# Patient Record
Sex: FEMALE | Race: BLACK OR AFRICAN AMERICAN | NOT HISPANIC OR LATINO | URBAN - METROPOLITAN AREA
[De-identification: names, ages, dates, MRNs, and addresses within clinical notes are randomized per-mention and may not be internally consistent; named-entity substitution may affect disease eponyms.]

---

## 2022-09-05 ENCOUNTER — INPATIENT (INPATIENT)
Facility: HOSPITAL | Age: 72
LOS: 3 days | Discharge: HOME | End: 2022-09-09
Attending: INTERNAL MEDICINE | Admitting: INTERNAL MEDICINE

## 2022-09-05 VITALS
SYSTOLIC BLOOD PRESSURE: 158 MMHG | HEART RATE: 113 BPM | OXYGEN SATURATION: 100 % | DIASTOLIC BLOOD PRESSURE: 78 MMHG | TEMPERATURE: 98 F | RESPIRATION RATE: 18 BRPM

## 2022-09-05 LAB
ALBUMIN SERPL ELPH-MCNC: 3.7 G/DL — SIGNIFICANT CHANGE UP (ref 3.3–5)
ALP SERPL-CCNC: 79 U/L — SIGNIFICANT CHANGE UP (ref 40–120)
ALT FLD-CCNC: 17 U/L — SIGNIFICANT CHANGE UP (ref 4–33)
ANION GAP SERPL CALC-SCNC: 18 MMOL/L — HIGH (ref 7–14)
APTT BLD: 28.2 SEC — SIGNIFICANT CHANGE UP (ref 27–36.3)
AST SERPL-CCNC: 31 U/L — SIGNIFICANT CHANGE UP (ref 4–32)
B-OH-BUTYR SERPL-SCNC: 2.3 MMOL/L — HIGH (ref 0–0.4)
BASE EXCESS BLDV CALC-SCNC: 0.7 MMOL/L — SIGNIFICANT CHANGE UP (ref -2–3)
BASOPHILS # BLD AUTO: 0.01 K/UL — SIGNIFICANT CHANGE UP (ref 0–0.2)
BASOPHILS NFR BLD AUTO: 0.1 % — SIGNIFICANT CHANGE UP (ref 0–2)
BILIRUB SERPL-MCNC: 0.5 MG/DL — SIGNIFICANT CHANGE UP (ref 0.2–1.2)
BLOOD GAS VENOUS COMPREHENSIVE RESULT: SIGNIFICANT CHANGE UP
BUN SERPL-MCNC: 12 MG/DL — SIGNIFICANT CHANGE UP (ref 7–23)
CALCIUM SERPL-MCNC: 10.6 MG/DL — HIGH (ref 8.4–10.5)
CHLORIDE BLDV-SCNC: 97 MMOL/L — SIGNIFICANT CHANGE UP (ref 96–108)
CHLORIDE SERPL-SCNC: 94 MMOL/L — LOW (ref 98–107)
CO2 BLDV-SCNC: 29 MMOL/L — HIGH (ref 22–26)
CO2 SERPL-SCNC: 23 MMOL/L — SIGNIFICANT CHANGE UP (ref 22–31)
CREAT SERPL-MCNC: 0.61 MG/DL — SIGNIFICANT CHANGE UP (ref 0.5–1.3)
D DIMER BLD IA.RAPID-MCNC: 947 NG/ML DDU — HIGH
EGFR: 94 ML/MIN/1.73M2 — SIGNIFICANT CHANGE UP
EOSINOPHIL # BLD AUTO: 0.04 K/UL — SIGNIFICANT CHANGE UP (ref 0–0.5)
EOSINOPHIL NFR BLD AUTO: 0.4 % — SIGNIFICANT CHANGE UP (ref 0–6)
GAS PNL BLDV: 132 MMOL/L — LOW (ref 136–145)
GLUCOSE BLDV-MCNC: 445 MG/DL — HIGH (ref 70–99)
GLUCOSE SERPL-MCNC: 433 MG/DL — HIGH (ref 70–99)
HCO3 BLDV-SCNC: 27 MMOL/L — SIGNIFICANT CHANGE UP (ref 22–29)
HCT VFR BLD CALC: 36.2 % — SIGNIFICANT CHANGE UP (ref 34.5–45)
HCT VFR BLDA CALC: 37 % — SIGNIFICANT CHANGE UP (ref 34.5–46.5)
HGB BLD CALC-MCNC: 12.4 G/DL — SIGNIFICANT CHANGE UP (ref 11.5–15.5)
HGB BLD-MCNC: 12.2 G/DL — SIGNIFICANT CHANGE UP (ref 11.5–15.5)
IANC: 7.59 K/UL — HIGH (ref 1.8–7.4)
IMM GRANULOCYTES NFR BLD AUTO: 0.8 % — SIGNIFICANT CHANGE UP (ref 0–1.5)
INR BLD: 1.12 RATIO — SIGNIFICANT CHANGE UP (ref 0.88–1.16)
LACTATE BLDV-MCNC: 1.9 MMOL/L — SIGNIFICANT CHANGE UP (ref 0.5–2)
LYMPHOCYTES # BLD AUTO: 1.5 K/UL — SIGNIFICANT CHANGE UP (ref 1–3.3)
LYMPHOCYTES # BLD AUTO: 14.9 % — SIGNIFICANT CHANGE UP (ref 13–44)
MAGNESIUM SERPL-MCNC: 1.9 MG/DL — SIGNIFICANT CHANGE UP (ref 1.6–2.6)
MCHC RBC-ENTMCNC: 30 PG — SIGNIFICANT CHANGE UP (ref 27–34)
MCHC RBC-ENTMCNC: 33.7 GM/DL — SIGNIFICANT CHANGE UP (ref 32–36)
MCV RBC AUTO: 88.9 FL — SIGNIFICANT CHANGE UP (ref 80–100)
MONOCYTES # BLD AUTO: 0.88 K/UL — SIGNIFICANT CHANGE UP (ref 0–0.9)
MONOCYTES NFR BLD AUTO: 8.7 % — SIGNIFICANT CHANGE UP (ref 2–14)
NEUTROPHILS # BLD AUTO: 7.59 K/UL — HIGH (ref 1.8–7.4)
NEUTROPHILS NFR BLD AUTO: 75.1 % — SIGNIFICANT CHANGE UP (ref 43–77)
NRBC # BLD: 0 /100 WBCS — SIGNIFICANT CHANGE UP (ref 0–0)
NRBC # FLD: 0 K/UL — SIGNIFICANT CHANGE UP (ref 0–0)
PCO2 BLDV: 52 MMHG — HIGH (ref 39–42)
PH BLDV: 7.33 — SIGNIFICANT CHANGE UP (ref 7.32–7.43)
PLATELET # BLD AUTO: 508 K/UL — HIGH (ref 150–400)
PO2 BLDV: 21 MMHG — SIGNIFICANT CHANGE UP
POTASSIUM BLDV-SCNC: 6 MMOL/L — HIGH (ref 3.5–5.1)
POTASSIUM SERPL-MCNC: 4.9 MMOL/L — SIGNIFICANT CHANGE UP (ref 3.5–5.3)
POTASSIUM SERPL-SCNC: 4.9 MMOL/L — SIGNIFICANT CHANGE UP (ref 3.5–5.3)
PROT SERPL-MCNC: 8.2 G/DL — SIGNIFICANT CHANGE UP (ref 6–8.3)
PROTHROM AB SERPL-ACNC: 13 SEC — SIGNIFICANT CHANGE UP (ref 10.5–13.4)
RBC # BLD: 4.07 M/UL — SIGNIFICANT CHANGE UP (ref 3.8–5.2)
RBC # FLD: 12.1 % — SIGNIFICANT CHANGE UP (ref 10.3–14.5)
SAO2 % BLDV: 25.3 % — SIGNIFICANT CHANGE UP
SODIUM SERPL-SCNC: 135 MMOL/L — SIGNIFICANT CHANGE UP (ref 135–145)
TROPONIN T, HIGH SENSITIVITY RESULT: 9 NG/L — SIGNIFICANT CHANGE UP
TSH SERPL-MCNC: 0.98 UIU/ML — SIGNIFICANT CHANGE UP (ref 0.27–4.2)
WBC # BLD: 10.1 K/UL — SIGNIFICANT CHANGE UP (ref 3.8–10.5)
WBC # FLD AUTO: 10.1 K/UL — SIGNIFICANT CHANGE UP (ref 3.8–10.5)

## 2022-09-05 PROCEDURE — 93010 ELECTROCARDIOGRAM REPORT: CPT

## 2022-09-05 PROCEDURE — 99285 EMERGENCY DEPT VISIT HI MDM: CPT | Mod: 25

## 2022-09-05 PROCEDURE — 71046 X-RAY EXAM CHEST 2 VIEWS: CPT | Mod: 26

## 2022-09-05 RX ORDER — SODIUM CHLORIDE 9 MG/ML
1000 INJECTION INTRAMUSCULAR; INTRAVENOUS; SUBCUTANEOUS ONCE
Refills: 0 | Status: COMPLETED | OUTPATIENT
Start: 2022-09-05 | End: 2022-09-05

## 2022-09-05 RX ORDER — ACETAMINOPHEN 500 MG
650 TABLET ORAL ONCE
Refills: 0 | Status: COMPLETED | OUTPATIENT
Start: 2022-09-05 | End: 2022-09-05

## 2022-09-05 RX ORDER — CEFTRIAXONE 500 MG/1
1000 INJECTION, POWDER, FOR SOLUTION INTRAMUSCULAR; INTRAVENOUS ONCE
Refills: 0 | Status: COMPLETED | OUTPATIENT
Start: 2022-09-05 | End: 2022-09-05

## 2022-09-05 RX ORDER — AZITHROMYCIN 500 MG/1
500 TABLET, FILM COATED ORAL ONCE
Refills: 0 | Status: COMPLETED | OUTPATIENT
Start: 2022-09-05 | End: 2022-09-05

## 2022-09-05 RX ADMIN — SODIUM CHLORIDE 1000 MILLILITER(S): 9 INJECTION INTRAMUSCULAR; INTRAVENOUS; SUBCUTANEOUS at 22:40

## 2022-09-05 RX ADMIN — CEFTRIAXONE 100 MILLIGRAM(S): 500 INJECTION, POWDER, FOR SOLUTION INTRAMUSCULAR; INTRAVENOUS at 22:41

## 2022-09-05 RX ADMIN — Medication 650 MILLIGRAM(S): at 22:40

## 2022-09-05 RX ADMIN — AZITHROMYCIN 255 MILLIGRAM(S): 500 TABLET, FILM COATED ORAL at 23:37

## 2022-09-05 NOTE — ED ADULT NURSE NOTE - OBJECTIVE STATEMENT
73 y/o female, a&ox4, received to  7 with c/o cough. Pt reports cough for the past few weeks, also endorses "low" O2 sat recorded at home, unsure of levels. PMH of HTN and type 2 diabetes, non-insulin dependent. Pt speaking in clear and coherent sentences, Respirations are even and unlabored, no signs of respiratory distress. NSR on cardiac monitor, 12 lead ECG completed. Pt coughing several times throughout assessment, non-productive cough with mucous build up. Rectal temp of 100.3 recorded, MD Cabello made aware. Skin intact, no signs of pressure ulcer or skin breakdown. Pt claims she is ambulatory at baseline without assistive device. 20G IV placed to right AC, labs collected and sent off.

## 2022-09-05 NOTE — ED PROVIDER NOTE - CLINICAL SUMMARY MEDICAL DECISION MAKING FREE TEXT BOX
71yo F PMH HTN HLD T2DM (on oral meds) presents today c/o productive cough, headache s/p fall 1 wk ago. Pt tachycardic to 110s at presentation. Neuro exam nonfocal. Gait WNL. Given recent travel from [place] last week. Plan to obtain labs, urine, CT head, CXR, dimer, reassess.

## 2022-09-05 NOTE — ED PROVIDER NOTE - PROGRESS NOTE DETAILS
pt coughing in room, +productive w temp 100.3 pt stable, noted to have an elevated dimer. ct angio chest r/o PE ordered. pending imaging. pending trop to result and tba. sign out Dr Ferrer

## 2022-09-05 NOTE — ED ADULT TRIAGE NOTE - CHIEF COMPLAINT QUOTE
cough    c/o productive cough expectorating brown phlegm tonight.  daughter-in-law is rn.. state pulse ox was 85% but pt was able to speak freely.  .  pt c/o ha with cough.,  denies fever, cp, sob, abd pain, n/v/d/.  speaking freely in full sentences.  pmhx diabetes type 2, htn, hi chol.

## 2022-09-05 NOTE — ED PROVIDER NOTE - PHYSICAL EXAMINATION
G: NAD, cooperative with exam   H: NCAT  E: EOMI   M: Mucous membranes moist   R: CTABL, nWOB  C: RRR  A: Soft, NT/ND, no rebound/guarding   MSK: no calf tenderness, no LE edema  Neuro: CN2-12 grossly intact, A&Ox4, MS +5/5 in UE and LE BL, gross sensation intact in UE and LE BL, gait smooth and coordinated

## 2022-09-05 NOTE — ED PROVIDER NOTE - ATTENDING CONTRIBUTION TO CARE
intact Attending Statement: I have personally seen and examined this patient. I have fully participated in the care of this patient. I have reviewed all pertinent clinical information, including history physical exam, plan and the Resident's note and agree except as noted  73yo F PMH HTN HLD T2DM (on oral meds) from home co cough x one week. +productive cough. no fever. no chest pain no SOB. Tonight son states a family friend checked  her "oxygen level it was low" advised to come to ED. pt denies feeling SOB/ pt recently went to a wedding, had three falls, state "I didn't know the place, I fell" "missed steps" pt able to recall all three falls, states she hit her head once. no LOC. family helped her up, has been ambulatory since. no headache no neck. no cp no abdominal pain. nl po intake. no focal numbness/weakness. Vital signs noted. pulse ox 92 lowest to 100RA  temp 100.3 tachycardiac  good air entry no retractions no work of breathing. answering all questions no difficulty soft nontender abdomen. no  rebound. no guarding. no sign of trauma. no CVAT no pedal edema. no calf tenderness. normal pulses bilateral feet. no lac or abrasion of ext. AO3 ambulatory no assistance  plan labs, ekg, ct head, cxr, telemonitor   SR no SCOOBY

## 2022-09-05 NOTE — ED PROVIDER NOTE - OBJECTIVE STATEMENT
73yo F PMH HTN HLD T2DM (on oral meds) presents today c/o productive cough. Cough onset 1 wk ago. Now expectorating brown phlegm tonight.  Daughter-in-law is a nurse. States her son and herself recently got  in [place]. While there, patient had 3 falls. Patient does not recall the initial two falls, but states the last fall was because she stubbed her toe while speaking to someone. +Head trauma. No LOC. Per daughter in law, today patient noted to have inc WOB and states pulse ox was 85% but pt was able to speak in no acute distress. Denies chest pain or palpitations. No prior history of the same. Pt c/o headache ongoing over the same time frame - no laterality. Denies fever, abd pain, n/v/d.

## 2022-09-05 NOTE — ED PROVIDER NOTE - NS ED ROS FT
Gen: No F/C/NS  Head: No falls/head trauma  Eyes: No changes in vision   Resp: +cough +trouble breathing  Cardiovascular: No chest pain or palpitation  Gastroenteric: No N/V/D  :  No change in urine output, dysuria or hematuria   MS: No joint or muscle pain  Neuro: No headache

## 2022-09-05 NOTE — ED ADULT NURSE NOTE - CHIEF COMPLAINT QUOTE
Addended by: OLU PEREZ on: 5/27/2020 03:40 PM     Modules accepted: Orders    
cough    c/o productive cough expectorating brown phlegm tonight.  daughter-in-law is rn.. state pulse ox was 85% but pt was able to speak freely.  .  pt c/o ha with cough.,  denies fever, cp, sob, abd pain, n/v/d/.  speaking freely in full sentences.  pmhx diabetes type 2, htn, hi chol.

## 2022-09-06 DIAGNOSIS — M25.512 PAIN IN LEFT SHOULDER: ICD-10-CM

## 2022-09-06 DIAGNOSIS — E11.9 TYPE 2 DIABETES MELLITUS WITHOUT COMPLICATIONS: ICD-10-CM

## 2022-09-06 DIAGNOSIS — Z29.9 ENCOUNTER FOR PROPHYLACTIC MEASURES, UNSPECIFIED: ICD-10-CM

## 2022-09-06 DIAGNOSIS — M79.89 OTHER SPECIFIED SOFT TISSUE DISORDERS: ICD-10-CM

## 2022-09-06 DIAGNOSIS — J18.9 PNEUMONIA, UNSPECIFIED ORGANISM: ICD-10-CM
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
## 2022-09-06 DIAGNOSIS — I10 ESSENTIAL (PRIMARY) HYPERTENSION: ICD-10-CM

## 2022-09-06 DIAGNOSIS — H26.9 UNSPECIFIED CATARACT: ICD-10-CM

## 2022-09-06 DIAGNOSIS — K64.4 RESIDUAL HEMORRHOIDAL SKIN TAGS: ICD-10-CM

## 2022-09-06 DIAGNOSIS — Z98.49 CATARACT EXTRACTION STATUS, UNSPECIFIED EYE: Chronic | ICD-10-CM

## 2022-09-06 DIAGNOSIS — E78.5 HYPERLIPIDEMIA, UNSPECIFIED: ICD-10-CM

## 2022-09-06 LAB
A1C WITH ESTIMATED AVERAGE GLUCOSE RESULT: 11.9 % — HIGH (ref 4–5.6)
ANION GAP SERPL CALC-SCNC: 12 MMOL/L — SIGNIFICANT CHANGE UP (ref 7–14)
APPEARANCE UR: CLEAR — SIGNIFICANT CHANGE UP
B-OH-BUTYR SERPL-SCNC: 1.3 MMOL/L — HIGH (ref 0–0.4)
BACTERIA # UR AUTO: NEGATIVE — SIGNIFICANT CHANGE UP
BASE EXCESS BLDV CALC-SCNC: -4.6 MMOL/L — LOW (ref -2–3)
BASE EXCESS BLDV CALC-SCNC: 1.6 MMOL/L — SIGNIFICANT CHANGE UP (ref -2–3)
BASOPHILS # BLD AUTO: 0.01 K/UL — SIGNIFICANT CHANGE UP (ref 0–0.2)
BASOPHILS NFR BLD AUTO: 0.1 % — SIGNIFICANT CHANGE UP (ref 0–2)
BILIRUB UR-MCNC: NEGATIVE — SIGNIFICANT CHANGE UP
BLOOD GAS VENOUS COMPREHENSIVE RESULT: SIGNIFICANT CHANGE UP
BLOOD GAS VENOUS COMPREHENSIVE RESULT: SIGNIFICANT CHANGE UP
BUN SERPL-MCNC: 6 MG/DL — LOW (ref 7–23)
CALCIUM SERPL-MCNC: 8.7 MG/DL — SIGNIFICANT CHANGE UP (ref 8.4–10.5)
CHLORIDE BLDV-SCNC: 101 MMOL/L — SIGNIFICANT CHANGE UP (ref 96–108)
CHLORIDE BLDV-SCNC: 102 MMOL/L — SIGNIFICANT CHANGE UP (ref 96–108)
CHLORIDE SERPL-SCNC: 99 MMOL/L — SIGNIFICANT CHANGE UP (ref 98–107)
CO2 BLDV-SCNC: 23 MMOL/L — SIGNIFICANT CHANGE UP (ref 22–26)
CO2 BLDV-SCNC: 27 MMOL/L — HIGH (ref 22–26)
CO2 SERPL-SCNC: 24 MMOL/L — SIGNIFICANT CHANGE UP (ref 22–31)
COLOR SPEC: YELLOW — SIGNIFICANT CHANGE UP
CREAT SERPL-MCNC: 0.3 MG/DL — LOW (ref 0.5–1.3)
DIFF PNL FLD: ABNORMAL
EGFR: 112 ML/MIN/1.73M2 — SIGNIFICANT CHANGE UP
EOSINOPHIL # BLD AUTO: 0.05 K/UL — SIGNIFICANT CHANGE UP (ref 0–0.5)
EOSINOPHIL NFR BLD AUTO: 0.6 % — SIGNIFICANT CHANGE UP (ref 0–6)
EPI CELLS # UR: SIGNIFICANT CHANGE UP
ESTIMATED AVERAGE GLUCOSE: 295 — SIGNIFICANT CHANGE UP
GAS PNL BLDV: 131 MMOL/L — LOW (ref 136–145)
GAS PNL BLDV: 132 MMOL/L — LOW (ref 136–145)
GAS PNL BLDV: SIGNIFICANT CHANGE UP
GLUCOSE BLDV-MCNC: 255 MG/DL — HIGH (ref 70–99)
GLUCOSE BLDV-MCNC: 421 MG/DL — HIGH (ref 70–99)
GLUCOSE SERPL-MCNC: 244 MG/DL — HIGH (ref 70–99)
GLUCOSE UR QL: ABNORMAL
HCO3 BLDV-SCNC: 22 MMOL/L — SIGNIFICANT CHANGE UP (ref 22–29)
HCO3 BLDV-SCNC: 26 MMOL/L — SIGNIFICANT CHANGE UP (ref 22–29)
HCT VFR BLD CALC: 31.5 % — LOW (ref 34.5–45)
HCT VFR BLDA CALC: 28 % — LOW (ref 34.5–46.5)
HCT VFR BLDA CALC: 33 % — LOW (ref 34.5–46.5)
HGB BLD CALC-MCNC: 11.1 G/DL — LOW (ref 11.5–15.5)
HGB BLD CALC-MCNC: 9.4 G/DL — LOW (ref 11.5–15.5)
HGB BLD-MCNC: 10.6 G/DL — LOW (ref 11.5–15.5)
HYALINE CASTS # UR AUTO: 1 /LPF — SIGNIFICANT CHANGE UP (ref 0–7)
IANC: 6.78 K/UL — SIGNIFICANT CHANGE UP (ref 1.8–7.4)
IMM GRANULOCYTES NFR BLD AUTO: 0.5 % — SIGNIFICANT CHANGE UP (ref 0–1.5)
KETONES UR-MCNC: ABNORMAL
LACTATE BLDV-MCNC: 1.1 MMOL/L — SIGNIFICANT CHANGE UP (ref 0.5–2)
LACTATE BLDV-MCNC: 1.3 MMOL/L — SIGNIFICANT CHANGE UP (ref 0.5–2)
LEUKOCYTE ESTERASE UR-ACNC: NEGATIVE — SIGNIFICANT CHANGE UP
LYMPHOCYTES # BLD AUTO: 1.1 K/UL — SIGNIFICANT CHANGE UP (ref 1–3.3)
LYMPHOCYTES # BLD AUTO: 12.7 % — LOW (ref 13–44)
MAGNESIUM SERPL-MCNC: 1.5 MG/DL — LOW (ref 1.6–2.6)
MCHC RBC-ENTMCNC: 29.8 PG — SIGNIFICANT CHANGE UP (ref 27–34)
MCHC RBC-ENTMCNC: 33.7 GM/DL — SIGNIFICANT CHANGE UP (ref 32–36)
MCV RBC AUTO: 88.5 FL — SIGNIFICANT CHANGE UP (ref 80–100)
MONOCYTES # BLD AUTO: 0.67 K/UL — SIGNIFICANT CHANGE UP (ref 0–0.9)
MONOCYTES NFR BLD AUTO: 7.7 % — SIGNIFICANT CHANGE UP (ref 2–14)
NEUTROPHILS # BLD AUTO: 6.78 K/UL — SIGNIFICANT CHANGE UP (ref 1.8–7.4)
NEUTROPHILS NFR BLD AUTO: 78.4 % — HIGH (ref 43–77)
NITRITE UR-MCNC: NEGATIVE — SIGNIFICANT CHANGE UP
NRBC # BLD: 0 /100 WBCS — SIGNIFICANT CHANGE UP (ref 0–0)
NRBC # FLD: 0 K/UL — SIGNIFICANT CHANGE UP (ref 0–0)
PCO2 BLDV: 38 MMHG — LOW (ref 39–42)
PCO2 BLDV: 44 MMHG — HIGH (ref 39–42)
PH BLDV: 7.3 — LOW (ref 7.32–7.43)
PH BLDV: 7.44 — HIGH (ref 7.32–7.43)
PH UR: 6 — SIGNIFICANT CHANGE UP (ref 5–8)
PHOSPHATE SERPL-MCNC: 1.4 MG/DL — LOW (ref 2.5–4.5)
PLATELET # BLD AUTO: 421 K/UL — HIGH (ref 150–400)
PO2 BLDV: 35 MMHG — SIGNIFICANT CHANGE UP
PO2 BLDV: 47 MMHG — SIGNIFICANT CHANGE UP
POTASSIUM BLDV-SCNC: 3.2 MMOL/L — LOW (ref 3.5–5.1)
POTASSIUM BLDV-SCNC: 3.6 MMOL/L — SIGNIFICANT CHANGE UP (ref 3.5–5.1)
POTASSIUM SERPL-MCNC: 3.1 MMOL/L — LOW (ref 3.5–5.3)
POTASSIUM SERPL-SCNC: 3.1 MMOL/L — LOW (ref 3.5–5.3)
PROT UR-MCNC: ABNORMAL
RBC # BLD: 3.56 M/UL — LOW (ref 3.8–5.2)
RBC # FLD: 11.9 % — SIGNIFICANT CHANGE UP (ref 10.3–14.5)
RBC CASTS # UR COMP ASSIST: SIGNIFICANT CHANGE UP /HPF (ref 0–4)
SAO2 % BLDV: 58.1 % — SIGNIFICANT CHANGE UP
SAO2 % BLDV: 81 % — SIGNIFICANT CHANGE UP
SARS-COV-2 RNA SPEC QL NAA+PROBE: SIGNIFICANT CHANGE UP
SODIUM SERPL-SCNC: 135 MMOL/L — SIGNIFICANT CHANGE UP (ref 135–145)
SP GR SPEC: 1.04 — SIGNIFICANT CHANGE UP (ref 1.01–1.05)
UROBILINOGEN FLD QL: SIGNIFICANT CHANGE UP
WBC # BLD: 8.65 K/UL — SIGNIFICANT CHANGE UP (ref 3.8–10.5)
WBC # FLD AUTO: 8.65 K/UL — SIGNIFICANT CHANGE UP (ref 3.8–10.5)
WBC UR QL: 5 /HPF — SIGNIFICANT CHANGE UP (ref 0–5)

## 2022-09-06 PROCEDURE — 99223 1ST HOSP IP/OBS HIGH 75: CPT

## 2022-09-06 PROCEDURE — 71275 CT ANGIOGRAPHY CHEST: CPT | Mod: 26,MG

## 2022-09-06 PROCEDURE — G1004: CPT

## 2022-09-06 PROCEDURE — 70450 CT HEAD/BRAIN W/O DYE: CPT | Mod: 26,MA

## 2022-09-06 PROCEDURE — 99254 IP/OBS CNSLTJ NEW/EST MOD 60: CPT

## 2022-09-06 RX ORDER — MAGNESIUM SULFATE 500 MG/ML
2 VIAL (ML) INJECTION ONCE
Refills: 0 | Status: COMPLETED | OUTPATIENT
Start: 2022-09-06 | End: 2022-09-06

## 2022-09-06 RX ORDER — CHLORHEXIDINE GLUCONATE 213 G/1000ML
1 SOLUTION TOPICAL DAILY
Refills: 0 | Status: DISCONTINUED | OUTPATIENT
Start: 2022-09-06 | End: 2022-09-09

## 2022-09-06 RX ORDER — SODIUM CHLORIDE 9 MG/ML
1000 INJECTION, SOLUTION INTRAVENOUS
Refills: 0 | Status: DISCONTINUED | OUTPATIENT
Start: 2022-09-06 | End: 2022-09-09

## 2022-09-06 RX ORDER — INSULIN LISPRO 100/ML
VIAL (ML) SUBCUTANEOUS EVERY 6 HOURS
Refills: 0 | Status: DISCONTINUED | OUTPATIENT
Start: 2022-09-06 | End: 2022-09-06

## 2022-09-06 RX ORDER — POLYETHYLENE GLYCOL 3350 17 G/17G
17 POWDER, FOR SOLUTION ORAL DAILY
Refills: 0 | Status: DISCONTINUED | OUTPATIENT
Start: 2022-09-06 | End: 2022-09-09

## 2022-09-06 RX ORDER — SITAGLIPTIN 50 MG/1
1 TABLET, FILM COATED ORAL
Qty: 0 | Refills: 0 | DISCHARGE

## 2022-09-06 RX ORDER — METFORMIN HYDROCHLORIDE 850 MG/1
1 TABLET ORAL
Qty: 0 | Refills: 0 | DISCHARGE

## 2022-09-06 RX ORDER — INSULIN LISPRO 100/ML
4 VIAL (ML) SUBCUTANEOUS
Refills: 0 | Status: DISCONTINUED | OUTPATIENT
Start: 2022-09-06 | End: 2022-09-07

## 2022-09-06 RX ORDER — CEFTRIAXONE 500 MG/1
1000 INJECTION, POWDER, FOR SOLUTION INTRAMUSCULAR; INTRAVENOUS EVERY 24 HOURS
Refills: 0 | Status: DISCONTINUED | OUTPATIENT
Start: 2022-09-06 | End: 2022-09-09

## 2022-09-06 RX ORDER — ATORVASTATIN CALCIUM 80 MG/1
1 TABLET, FILM COATED ORAL
Qty: 0 | Refills: 0 | DISCHARGE

## 2022-09-06 RX ORDER — INSULIN LISPRO 100/ML
VIAL (ML) SUBCUTANEOUS AT BEDTIME
Refills: 0 | Status: DISCONTINUED | OUTPATIENT
Start: 2022-09-06 | End: 2022-09-06

## 2022-09-06 RX ORDER — ENOXAPARIN SODIUM 100 MG/ML
40 INJECTION SUBCUTANEOUS EVERY 24 HOURS
Refills: 0 | Status: DISCONTINUED | OUTPATIENT
Start: 2022-09-07 | End: 2022-09-09

## 2022-09-06 RX ORDER — INSULIN LISPRO 100/ML
VIAL (ML) SUBCUTANEOUS
Refills: 0 | Status: DISCONTINUED | OUTPATIENT
Start: 2022-09-06 | End: 2022-09-09

## 2022-09-06 RX ORDER — INSULIN LISPRO 100/ML
3 VIAL (ML) SUBCUTANEOUS
Refills: 0 | Status: DISCONTINUED | OUTPATIENT
Start: 2022-09-06 | End: 2022-09-06

## 2022-09-06 RX ORDER — ACETAMINOPHEN 500 MG
650 TABLET ORAL EVERY 6 HOURS
Refills: 0 | Status: DISCONTINUED | OUTPATIENT
Start: 2022-09-06 | End: 2022-09-09

## 2022-09-06 RX ORDER — ATORVASTATIN CALCIUM 80 MG/1
40 TABLET, FILM COATED ORAL AT BEDTIME
Refills: 0 | Status: DISCONTINUED | OUTPATIENT
Start: 2022-09-06 | End: 2022-09-09

## 2022-09-06 RX ORDER — POTASSIUM PHOSPHATE, MONOBASIC POTASSIUM PHOSPHATE, DIBASIC 236; 224 MG/ML; MG/ML
15 INJECTION, SOLUTION INTRAVENOUS ONCE
Refills: 0 | Status: COMPLETED | OUTPATIENT
Start: 2022-09-06 | End: 2022-09-06

## 2022-09-06 RX ORDER — INSULIN LISPRO 100/ML
VIAL (ML) SUBCUTANEOUS
Refills: 0 | Status: DISCONTINUED | OUTPATIENT
Start: 2022-09-06 | End: 2022-09-06

## 2022-09-06 RX ORDER — DEXTROSE 50 % IN WATER 50 %
12.5 SYRINGE (ML) INTRAVENOUS ONCE
Refills: 0 | Status: DISCONTINUED | OUTPATIENT
Start: 2022-09-06 | End: 2022-09-09

## 2022-09-06 RX ORDER — INSULIN LISPRO 100/ML
VIAL (ML) SUBCUTANEOUS AT BEDTIME
Refills: 0 | Status: DISCONTINUED | OUTPATIENT
Start: 2022-09-06 | End: 2022-09-09

## 2022-09-06 RX ORDER — SODIUM CHLORIDE 9 MG/ML
1000 INJECTION, SOLUTION INTRAVENOUS ONCE
Refills: 0 | Status: COMPLETED | OUTPATIENT
Start: 2022-09-06 | End: 2022-09-06

## 2022-09-06 RX ORDER — INFLUENZA VIRUS VACCINE 15; 15; 15; 15 UG/.5ML; UG/.5ML; UG/.5ML; UG/.5ML
0.7 SUSPENSION INTRAMUSCULAR ONCE
Refills: 0 | Status: DISCONTINUED | OUTPATIENT
Start: 2022-09-06 | End: 2022-09-09

## 2022-09-06 RX ORDER — POTASSIUM CHLORIDE 20 MEQ
20 PACKET (EA) ORAL ONCE
Refills: 0 | Status: COMPLETED | OUTPATIENT
Start: 2022-09-06 | End: 2022-09-06

## 2022-09-06 RX ORDER — DEXTROSE 50 % IN WATER 50 %
25 SYRINGE (ML) INTRAVENOUS ONCE
Refills: 0 | Status: DISCONTINUED | OUTPATIENT
Start: 2022-09-06 | End: 2022-09-09

## 2022-09-06 RX ORDER — INSULIN GLARGINE 100 [IU]/ML
12 INJECTION, SOLUTION SUBCUTANEOUS AT BEDTIME
Refills: 0 | Status: DISCONTINUED | OUTPATIENT
Start: 2022-09-06 | End: 2022-09-06

## 2022-09-06 RX ORDER — DEXTROSE 50 % IN WATER 50 %
15 SYRINGE (ML) INTRAVENOUS ONCE
Refills: 0 | Status: DISCONTINUED | OUTPATIENT
Start: 2022-09-06 | End: 2022-09-09

## 2022-09-06 RX ORDER — INSULIN GLARGINE 100 [IU]/ML
12 INJECTION, SOLUTION SUBCUTANEOUS EVERY MORNING
Refills: 0 | Status: DISCONTINUED | OUTPATIENT
Start: 2022-09-07 | End: 2022-09-07

## 2022-09-06 RX ORDER — AZITHROMYCIN 500 MG/1
500 TABLET, FILM COATED ORAL AT BEDTIME
Refills: 0 | Status: DISCONTINUED | OUTPATIENT
Start: 2022-09-06 | End: 2022-09-07

## 2022-09-06 RX ORDER — CLOPIDOGREL BISULFATE 75 MG/1
75 TABLET, FILM COATED ORAL DAILY
Refills: 0 | Status: DISCONTINUED | OUTPATIENT
Start: 2022-09-07 | End: 2022-09-09

## 2022-09-06 RX ORDER — SODIUM CHLORIDE 9 MG/ML
1000 INJECTION INTRAMUSCULAR; INTRAVENOUS; SUBCUTANEOUS
Refills: 0 | Status: DISCONTINUED | OUTPATIENT
Start: 2022-09-06 | End: 2022-09-09

## 2022-09-06 RX ORDER — SENNA PLUS 8.6 MG/1
2 TABLET ORAL AT BEDTIME
Refills: 0 | Status: DISCONTINUED | OUTPATIENT
Start: 2022-09-06 | End: 2022-09-09

## 2022-09-06 RX ADMIN — CEFTRIAXONE 100 MILLIGRAM(S): 500 INJECTION, POWDER, FOR SOLUTION INTRAMUSCULAR; INTRAVENOUS at 21:55

## 2022-09-06 RX ADMIN — SODIUM CHLORIDE 1000 MILLILITER(S): 9 INJECTION, SOLUTION INTRAVENOUS at 04:21

## 2022-09-06 RX ADMIN — Medication 20 MILLIEQUIVALENT(S): at 11:40

## 2022-09-06 RX ADMIN — Medication 3 UNIT(S): at 12:52

## 2022-09-06 RX ADMIN — Medication 200 MILLIGRAM(S): at 11:38

## 2022-09-06 RX ADMIN — INSULIN GLARGINE 12 UNIT(S): 100 INJECTION, SOLUTION SUBCUTANEOUS at 04:21

## 2022-09-06 RX ADMIN — ATORVASTATIN CALCIUM 40 MILLIGRAM(S): 80 TABLET, FILM COATED ORAL at 21:51

## 2022-09-06 RX ADMIN — Medication 25 GRAM(S): at 11:49

## 2022-09-06 RX ADMIN — SODIUM CHLORIDE 60 MILLILITER(S): 9 INJECTION INTRAMUSCULAR; INTRAVENOUS; SUBCUTANEOUS at 10:15

## 2022-09-06 RX ADMIN — CHLORHEXIDINE GLUCONATE 1 APPLICATION(S): 213 SOLUTION TOPICAL at 18:13

## 2022-09-06 RX ADMIN — Medication 4 UNIT(S): at 18:12

## 2022-09-06 RX ADMIN — AZITHROMYCIN 500 MILLIGRAM(S): 500 TABLET, FILM COATED ORAL at 21:51

## 2022-09-06 RX ADMIN — POTASSIUM PHOSPHATE, MONOBASIC POTASSIUM PHOSPHATE, DIBASIC 62.5 MILLIMOLE(S): 236; 224 INJECTION, SOLUTION INTRAVENOUS at 11:37

## 2022-09-06 RX ADMIN — Medication 1: at 12:53

## 2022-09-06 RX ADMIN — Medication 4: at 08:53

## 2022-09-06 RX ADMIN — Medication 2: at 18:11

## 2022-09-06 RX ADMIN — Medication 10 MILLIGRAM(S): at 11:39

## 2022-09-06 NOTE — H&P ADULT - NEGATIVE NEUROLOGICAL SYMPTOMS
no transient paralysis/no weakness/no paresthesias/no generalized seizures/no syncope/no loss of sensation/no difficulty walking/no headache/no loss of consciousness/no confusion

## 2022-09-06 NOTE — ED ADULT NURSE REASSESSMENT NOTE - NS ED NURSE REASSESS COMMENT FT1
Break RN note- patient resting quietly in bed, breathing even and nonlabored. No acute distress. Patient appears comfortable. Repeat labs sent. Safety maintained. Patient stable upon exiting the room.

## 2022-09-06 NOTE — H&P ADULT - NS ATTEND AMEND GEN_ALL_CORE FT
72F with HTN, T2DM on oral medication, HLD, presents with approximately 3 weeks of productive cough which has been worsening. Pt first noticed cough while in [place] visiting family. Since then has been more productive and frequent. Yesterday pt family member, who is a nurse, took her pulse ox and noted it was 85% so pt came to the ED for further evaluation. Denies associated dyspnea on exertion, chest pain, abd pain, n/v/d, fevers or chills. Pt noted to have tmax of 100.3 and HR 100s. s/p CTX and azithro. On exam pt is well appearing in no distress. No accessory muscle use. Scattered mild crackles in b/l lower lung fields. S1 and S2 no M/R/G. Abd soft, NTND. No LE edema. Extremities with FROM, no joint swelling or tenderness. Neuro exam without focal deficits. Pt is A&Ox3 with normal mood and affect. CT with multifocal PNA. Labs significant for hyperglycemia with an elevated A1c to 11.9    # Multifocal PNA  - CT with multifocal PNA suggestive of potential aspiration  - Continue CTX and azithromycin  - check sp cx, urine legionalla ag, urine strep ag  - follow up blood cultures which are in lab  # Type 2 Diabetes with hyperglycemia - A1c 11.9  - pt initially presented with mild DKA  - FS improving, AG now normal  - continue lantus 12u qAM, lispro 3u premeal, and ISS before meals and at bedtime  - trend FS  # normocytic anemia  - Hb 10.3 from 12 after IVF  - trend CBC  # thrombocytosis  - likely reactive in the setting of multifocal PNA  - trend CBC  # Mechanical fall  - CT-H without evidence of trauma  - PT consult  - fall precautions  # L shoulder pain- pt reports history of arthirits, FROM without tenderness  - PT and tylenol PRN pain  # LE swelling- pt reports intermittent LE swelling  - check LE duplex to eval for DVT

## 2022-09-06 NOTE — H&P ADULT - PROBLEM SELECTOR PLAN 6
- would recommend close outpatient follow up with GI referral   - keep stool soft with miralax and senna PRN

## 2022-09-06 NOTE — PHARMACOTHERAPY INTERVENTION NOTE - COMMENTS
Per [facility], medications listed in OMR dispensed to patient on 6/13/22 x 90 day supply.   Prescriber: Riana Roca ([phone]), attempted to call office to obtain additional information regarding home meds, on hold/no answer.

## 2022-09-06 NOTE — H&P ADULT - RESPIRATORY
normal/clear to auscultation bilaterally/no wheezes/no rales/no rhonchi/no use of accessory muscles/breath sounds equal/good air movement/respirations non-labored

## 2022-09-06 NOTE — H&P ADULT - PROBLEM SELECTOR PLAN 2
- Patient with serum glucose 433 in ED and AG 18, with BHB 2.3 and initial pH 7.33 concerning for mild DKA   - s/p 2L IVF and 12 units Lantus given in ED at 4:20 AM this morning   - FS trend from 386->372->315 now; given 4 units correctional lispro for 315  - keep NPO until FS < 300  - recheck stat BMP, VBG, and BHB   - check HgA1c  - monitor FS q6h while NPO  - Patient is 75kg, will start on conservative basal bolus regimen while admitted with sliding scale coverage  - will continue with 12u lantus daily in AM, and if diet started will start 3u lispro with meals and titrate from there   - if labs or FS worsens or HgA1c uncontrolled, will consult endocrine for assistance

## 2022-09-06 NOTE — CONSULT NOTE ADULT - SUBJECTIVE AND OBJECTIVE BOX
HPI:  72F PMHx HTN, HLD, DM (on oral meds) who presents c/o productive cough. Patient states she went to [place] recently (2-3 weeks ago) for her son and daughter in law's wedding. She states the cough started while there but was not bothersome to her and not too severe. She states that when she got home, about 1 week ago, cough started to get more severe and became productive of dark greenish sputum. She reports that her daughter in law who is a nurse at [employer] checked on her yesterday and found her oxygen levels to be low (per ED documentation - pulse ox 85%) and that she seemed like she was having difficulty breathing while sleeping. She reports mild frontal headache with bouts of coughing but no headache now. She admits chronic external hemorrhoid that she is concerned about, with mild constipation - last BM yesterday. She admits chronic and mild L shoulder pain with difficulty raising left arm. She admits having bilateral cataract surgery but she feels left cataract has returned which is bothersome to her. Of note, per chart review, she reportedly fell 3 times while in [place]. She states that she tripped and missed a step each time and remembers each fall - no LOC and she denies head trauma (though per ED chart review, she did endorse hitting her head). She denies any injuries as a result of the falls. In the ED, she was febrile to 100.3F but stable on room air. D-dimer was elevated to 947 and CTA was negative for PE but did show multifocal pneumonia. Her AG was 18 and sugars in the 300s, concerning for mild DKA and was given lantus 12 units and IVF. She was given CTX and Azithro for PNA and admitted.  (06 Sep 2022 08:25)    ENDOCRINE CONSULT  HPI:  71 yo F with PMH of HTN, HLD, uncontrolled DM2 presenting with cough, found to have pneumonia. Endocrine consulted for DM2 management.    Patient is unsure of her diabetes history but reports she has had diabetes for >3 weeks. She knows she takes pills for her diabetes but she is not sure which ones. Primary team was able to obtain from pharmacy that she is taking metformin 1000 mg bid and januvia 100 mg daily. She has never been on insulin. She does not check her sugars and she is afraid to take insulin. Reports her PCP wanted her to take insulin many years ago but she refused. She reports that that some family members have diabetes.    Endocrinologist: none    Last HbA1c: 11.9%    Home DM regimen:  metformin 1000 mg bid  januvia 100 mg daily    Inpatient DM regimen:  s/p lantus 12 units this morning + 4 units admelog with meals    Admission labs:  bicarb 23, AG 18, pH 7.3    Labs today:  bicarb 24, AG 12  BHB 1.3  pH 7.44    PAST MEDICAL & SURGICAL HISTORY:  Diabetes mellitus      Hypertension      Hyperlipidemia      S/P cataract surgery          FAMILY HISTORY:  No pertinent family history in first degree relatives        Social History:  no tobacco, etoh or drug use    MEDICATIONS  (STANDING):  atorvastatin 40 milliGRAM(s) Oral at bedtime  azithromycin   Tablet 500 milliGRAM(s) Oral at bedtime  cefTRIAXone   IVPB 1000 milliGRAM(s) IV Intermittent every 24 hours  chlorhexidine 2% Cloths 1 Application(s) Topical daily  dextrose 5%. 1000 milliLiter(s) (100 mL/Hr) IV Continuous <Continuous>  dextrose 5%. 1000 milliLiter(s) (50 mL/Hr) IV Continuous <Continuous>  dextrose 50% Injectable 25 Gram(s) IV Push once  dextrose 50% Injectable 12.5 Gram(s) IV Push once  dextrose 50% Injectable 25 Gram(s) IV Push once  enalapril 10 milliGRAM(s) Oral daily  influenza  Vaccine (HIGH DOSE) 0.7 milliLiter(s) IntraMuscular once  insulin lispro (ADMELOG) corrective regimen sliding scale   SubCutaneous three times a day before meals  insulin lispro (ADMELOG) corrective regimen sliding scale   SubCutaneous at bedtime  insulin lispro Injectable (ADMELOG) 3 Unit(s) SubCutaneous three times a day before meals  polyethylene glycol 3350 17 Gram(s) Oral daily  sodium chloride 0.9%. 1000 milliLiter(s) (60 mL/Hr) IV Continuous <Continuous>    MEDICATIONS  (PRN):  acetaminophen     Tablet .. 650 milliGRAM(s) Oral every 6 hours PRN Temp greater or equal to 38C (100.4F), Moderate Pain (4 - 6), Severe Pain (7 - 10)  dextrose Oral Gel 15 Gram(s) Oral once PRN Blood Glucose LESS THAN 70 milliGRAM(s)/deciliter  guaiFENesin Oral Liquid (Sugar-Free) 200 milliGRAM(s) Oral every 6 hours PRN Cough  senna 2 Tablet(s) Oral at bedtime PRN Constipation      Allergies    No Known Allergies    Intolerances        Review of Systems:  Constitutional: No fever, good appetite/po intake  Eyes: No blurry vision, diplopia  Neuro: No tremors  HEENT: No pain  Cardiovascular: No chest pain, palpitations  Respiratory: No SOB, no cough  GI: No nausea, vomiting,   : No dysuria, hematuria  Skin: no rash  Psych: no depression  Endocrine: no polyuria, polydipsia  Hem/lymph: no swelling  Osteoporosis: no fractures    ALL OTHER SYSTEMS REVIEWED AND NEGATIVE    PHYSICAL EXAM:  VITALS: T(C): 37.3 (09-06-22 @ 14:03)  T(F): 99.1 (09-06-22 @ 14:03), Max: 100.3 (09-05-22 @ 22:56)  HR: 103 (09-06-22 @ 14:03) (89 - 113)  BP: 163/62 (09-06-22 @ 14:03) (135/78 - 163/62)  RR:  (16 - 18)  SpO2:  (95% - 100%)  Wt(kg): --  GENERAL: NAD, well-groomed, well-developed  EYES: No proptosis, extraocular movements intact,  no lid lag, anicteric  HEENT:  Atraumatic, Normocephalic, moist mucous membranes  THYROID: Normal size, no palpable nodules, no thyromegaly  RESPIRATORY: Clear to auscultation bilaterally; No rales, rhonchi, wheezing, or rubs  CARDIOVASCULAR: Regular rate and rhythm; No murmurs; no peripheral edema  GI: Soft, nontender, non distended, normal bowel sounds  SKIN: Dry, intact, No rashes or lesions  EXTREMITIES: No foot ulcers, distal pedal pulses intact bilaterally  NEURO: sensation intact, no tremors  PSYCH: reactive affect, euthymic mood  CUSHING'S SIGNS: no striae or visible bruising                              10.6   8.65  )-----------( 421      ( 06 Sep 2022 10:10 )             31.5       09-06    135  |  99  |  6<L>  ----------------------------<  244<H>  3.1<L>   |  24  |  0.30<L>    eGFR: 112    Ca    8.7      09-06  Mg     1.50     09-06  Phos  1.4     09-06    TPro  8.2  /  Alb  3.7  /  TBili  0.5  /  DBili  x   /  AST  31  /  ALT  17  /  AlkPhos  79  09-05      Thyroid Function Tests:  09-05 @ 21:45 TSH 0.98 FreeT4 -- T3 -- Anti TPO -- Anti Thyroglobulin Ab -- TSI --          Radiology:

## 2022-09-06 NOTE — H&P ADULT - PROBLEM SELECTOR PLAN 4
- chronic L shoulder pain x 1 month per patient, before any falls per patient  - she appears to have full ROM of L shoulder, and no TTP   - tylenol PRN for pain  - PT eval ordered  - hold of on imaging for the moment as low concern for acute pathology

## 2022-09-06 NOTE — H&P ADULT - PROBLEM SELECTOR PLAN 8
- check lipid profile  - will clarify home meds when pharmacy opens - check lipid profile  - continue home lipitor 40

## 2022-09-06 NOTE — ED ADULT NURSE REASSESSMENT NOTE - NS ED NURSE REASSESS COMMENT FT1
Pt a&ox4, medicated as per MD orders, Respirations are even and unlabored, no signs of respiratory distress.

## 2022-09-06 NOTE — SWALLOW BEDSIDE ASSESSMENT ADULT - ASR SWALLOW RECOMMEND DIAG
MD to consider Cinesophagram if concern for aspiration pneumonia given recent chest imaging/VFSS/MBS MD to consider Cinesophagram IF concern for aspiration pneumonia given recent chest imaging/VFSS/MBS

## 2022-09-06 NOTE — CONSULT NOTE ADULT - ASSESSMENT
A/P: 73 yo F with PMH of HTN, HLD, uncontrolled DM2 presenting with cough, found to have pneumonia. Endocrine consulted for DM2 management.    #Type 2 Diabetes Mellitus, uncontrolled  - HbA1c  11.9%; home regimen: metformin 1000 mg bid, januvia 100 mg daily  - Recommend continuing lantus 12 units qAM (started today)  - Recommend admelog 4 units with meals  - Recommend low dose admelog correction scale TIDQAC and QHS  - Please check FSG before meals and QHS, or q6h while NPO  - RD consult  - hypoglycemia orderset prn  - discussed that she may need insulin for discharge however she does not think she will be able to do it, requested she think about it and we can teach her as this will be for optimal DM control  - Discharge planning: basal + metformin 1000 mg bid + januvia 100 mg daily (vs. add another PO agent if pt refuses insulin)    #Hypertension  - BP goal <130/80  - Management as per primary team    #Hyperlipidemia  - check fasting lipid panel as outpt  - atorvastatin 40 mg    Aicha Martino MD  Attending Physician   Department of Endocrinology, Diabetes and Metabolism   [employer] Teams for 09-06-22 @ 16:33.    If before 9AM or after 5PM, or on weekends/holidays, please call the Endocrine answering service for assistance ([phone]).  For nonurgent matters, please email [email or web address] for assistance.     Please note that a different provider may be following this patient each day.

## 2022-09-06 NOTE — H&P ADULT - PROBLEM SELECTOR PLAN 10
- Need med rec as above - will call  pharmacy shortly   - DVT ppx with lovenox 40mg daily   - Case and plan to be discussed with attending - Called patients pharmacy to confirm meds - see med rec. Plavix 75mg filled 6/13/22 for 90 day supply. Patient denies hx of MI or CVA - unclear indication - will continue for now given no bleed on CT. Will also email med rec pharmacist for confirmation of med rec as well as son was not sure of meds either. Patient unsure if she still takes metformin in addition to Januvia.   - DVT ppx with lovenox 40mg daily   - Case and plan discussed with Dr. Aguilar

## 2022-09-06 NOTE — SWALLOW BEDSIDE ASSESSMENT ADULT - COMMENTS
Per HPI: "72F PMHx HTN, HLD, DM (on oral meds) who presents c/o productive cough, found to have multifocal PNA and hyperglycemia."    WBC is WFL. CT Chest 9/6 "Bilateral lower lobe patchy opacities compatible with multifocal pneumonia. Tree-in-bud nodular opacities likely associated with distal airways impaction, and may be seen in the setting of aspiration".     Patient seen at bedside, awake/alert and oriented, during clinical swallow evaluation this PM. Patient able to follow directions and answer questions appropriately. Patient denied pain and denied swallowing difficulties. Patient was cooperative and receptive to PO trials.

## 2022-09-06 NOTE — H&P ADULT - MUSCULOSKELETAL
normal/ROM intact/no joint swelling/no joint erythema/no calf tenderness/normal gait/strength 5/5 bilateral upper extremities/strength 5/5 bilateral lower extremities details…

## 2022-09-06 NOTE — H&P ADULT - NSHPLABSRESULTS_GEN_ALL_CORE
12.2   10.10 )-----------( 508      ( 05 Sep 2022 21:45 )             36.2   09-05    135  |  94<L>  |  12  ----------------------------<  433<H>  4.9   |  23  |  0.61    Ca    10.6<H>      05 Sep 2022 21:45  Mg     1.90     09-05    TPro  8.2  /  Alb  3.7  /  TBili  0.5  /  DBili  x   /  AST  31  /  ALT  17  /  AlkPhos  79  09-05    BCx pending in lab x 2    D-dimer 947    ->372    Beta hydroxybutarate 2.3    pH 7.33->7.3, Lactate 1.1, pCO2 44, HCO3 22    TSH 0.98    Trop T 9 (negative x 1)    EKG: sinus tach @ 111bpm, QTc 432, no acute ischemic changes noted, no prior EKG to compare to     CXR: clear lungs    CTA chest: No pulmonary embolism to the level of the segmental pulmonary arteries or   visualized. Limited evaluation of more distal branches.  Bilateral lower lobe patchy opacities compatible with multifocal   pneumonia. Tree-in-bud nodular opacities likely associated with distal   airways impaction, and may be seen in the setting of aspiration.    CT head: No acute intracranial abnormalities.  Small vessel and atrophic changes.

## 2022-09-06 NOTE — PATIENT PROFILE ADULT - FALL HARM RISK - HARM RISK INTERVENTIONS
Assistance with ambulation/Assistance OOB with selected safe patient handling equipment/Communicate Risk of Fall with Harm to all staff/Monitor for mental status changes/Monitor gait and stability/Reinforce activity limits and safety measures with patient and family/Review medications for side effects contributing to fall risk/Sit up slowly, dangle for a short time, stand at bedside before walking/Tailored Fall Risk Interventions/Visual Cue: Yellow wristband and red socks/Bed in lowest position, wheels locked, appropriate side rails in place/Call bell, personal items and telephone in reach/Instruct patient to call for assistance before getting out of bed or chair/Non-slip footwear when patient is out of bed/[place] to call system/Physically safe environment - no spills, clutter or unnecessary equipment/Purposeful Proactive Rounding/Room/bathroom lighting operational, light cord in reach

## 2022-09-06 NOTE — H&P ADULT - PROBLEM SELECTOR PLAN 5
- Patient reports "swelling" of right thigh when she is constipated  - not swollen currently, no pain or discomfort  - given elevated D-dimer, will check LE duplex for further evaluation

## 2022-09-06 NOTE — H&P ADULT - PROBLEM SELECTOR PLAN 3
- 3 reported falls recently - no reported injuries   - patient initially reported head trauma to ED and then denied to me   - based on patients description of events they sound mechanical in nature  - CT head negative for acute findings  - PT eval ordered - patient may benefit from having cane or walker for safety   - fall prevention precautions ordered

## 2022-09-06 NOTE — H&P ADULT - PROBLEM SELECTOR PLAN 7
- patient reports recurrence of presumed L cataract despite having surgery previously   - will need close outpatient follow up with optho

## 2022-09-06 NOTE — H&P ADULT - ASSESSMENT
72F PMHx HTN, HLD, DM (on oral meds) who presents c/o productive cough, found to have multifocal PNA and hyperglycemia.

## 2022-09-06 NOTE — H&P ADULT - PROBLEM SELECTOR PLAN 1
- Patient reported hypoxia to 85% at home, now with good room air saturation here in high 90s  - hemodynamically stable, appears non toxic and in no distress, Tmax 100.3F   - Elevated D-dimer however CTA negative for D-dimer - does show multifocal PNA though   - s/p CTX and Azithro in ED - continue with same for now   - BCx x 2 sent already and in lab, check sputum culture, urine antigens, and UA   - monitor vitals q4h with pulse ox; incentive spirometry ordered  - Robitussin ordered for help with expectoration   - CTA shows findings that could be 2/2 aspiration - patient denies any trouble swallowing or choking, eats regular consistency diet at home; she passed bedside dysphagia screen here  - will start on diet, but will check S+S for full evaluation to be safe

## 2022-09-06 NOTE — H&P ADULT - HISTORY OF PRESENT ILLNESS
72F PMHx HTN, HLD, DM (on oral meds) who presents c/o productive cough. Patient states she went to [place] recently (2-3 weeks ago) for her son and daughter in law's wedding. She states the cough started while there but was not bothersome to her and not too severe. She states that when she got home, about 1 week ago, cough started to get more severe and became productive of dark greenish sputum. She reports that her daughter in law who is a nurse at [facility] [employer] checked on her yesterday and found her oxygen levels to be low (per ED documentation - pulse ox 85%) and that she seemed like she was having difficulty breathing while sleeping. She reports  72F PMHx HTN, HLD, DM (on oral meds) who presents c/o productive cough. Patient states she went to [place] recently (2-3 weeks ago) for her son and daughter in law's wedding. She states the cough started while there but was not bothersome to her and not too severe. She states that when she got home, about 1 week ago, cough started to get more severe and became productive of dark greenish sputum. She reports that her daughter in law who is a nurse at [facility] checked on her yesterday and found her oxygen levels to be low (per ED documentation - pulse ox 85%) and that she seemed like she was having difficulty breathing while sleeping. She reports mild frontal headache with bouts of coughing but no headache now. She admits chronic external hemorrhoid that she is concerned about, with mild constipation - last BM yesterday. She admits chronic and mild L shoulder pain with difficulty raising left arm. She admits having bilateral cataract surgery but she feels left cataract has returned which is bothersome to her. Of note, per chart review, she reportedly fell 3 times while in [place]. She states that she tripped and missed a step each time and remembers each fall - no LOC and she denies head trauma (though per ED chart review, she did endorse hitting her head). She denies any injuries as a result of the falls. In the ED, she was febrile to 100.3F but stable on room air. D-dimer was elevated to 947 and CTA was negative for PE but did show multifocal pneumonia. Her AG was 18 and sugars in the 300s, concerning for mild DKA and was given lantus 12 units and IVF. She was given CTX and Azithro for PNA and admitted.

## 2022-09-06 NOTE — H&P ADULT - NSHPSOCIALHISTORY_GEN_ALL_CORE
Lives with son and daughter in law.   Ambulates independently at baseline.    Denies smoking, drinking, drugs.

## 2022-09-06 NOTE — H&P ADULT - PROBLEM SELECTOR PLAN 9
- BP mildly elevated  - need med rec - patient unsure of her meds and I called son Urszula and he was not sure of meds either and was at work, recommended I call pharmacy as they will have list  - will call pharmacy when it opens at 9:30am - BP mildly elevated  - resume home enalapril 10mg daily

## 2022-09-07 DIAGNOSIS — E11.65 TYPE 2 DIABETES MELLITUS WITH HYPERGLYCEMIA: ICD-10-CM

## 2022-09-07 LAB
ALBUMIN SERPL ELPH-MCNC: 2.8 G/DL — LOW (ref 3.3–5)
ALP SERPL-CCNC: 64 U/L — SIGNIFICANT CHANGE UP (ref 40–120)
ALT FLD-CCNC: 14 U/L — SIGNIFICANT CHANGE UP (ref 4–33)
ANION GAP SERPL CALC-SCNC: 11 MMOL/L — SIGNIFICANT CHANGE UP (ref 7–14)
AST SERPL-CCNC: 22 U/L — SIGNIFICANT CHANGE UP (ref 4–32)
BASOPHILS # BLD AUTO: 0.01 K/UL — SIGNIFICANT CHANGE UP (ref 0–0.2)
BASOPHILS NFR BLD AUTO: 0.2 % — SIGNIFICANT CHANGE UP (ref 0–2)
BILIRUB SERPL-MCNC: 0.5 MG/DL — SIGNIFICANT CHANGE UP (ref 0.2–1.2)
BUN SERPL-MCNC: 5 MG/DL — LOW (ref 7–23)
CALCIUM SERPL-MCNC: 8.5 MG/DL — SIGNIFICANT CHANGE UP (ref 8.4–10.5)
CHLORIDE SERPL-SCNC: 96 MMOL/L — LOW (ref 98–107)
CHOLEST SERPL-MCNC: 88 MG/DL — SIGNIFICANT CHANGE UP
CO2 SERPL-SCNC: 27 MMOL/L — SIGNIFICANT CHANGE UP (ref 22–31)
CREAT SERPL-MCNC: 0.32 MG/DL — LOW (ref 0.5–1.3)
EGFR: 110 ML/MIN/1.73M2 — SIGNIFICANT CHANGE UP
EOSINOPHIL # BLD AUTO: 0.06 K/UL — SIGNIFICANT CHANGE UP (ref 0–0.5)
EOSINOPHIL NFR BLD AUTO: 0.9 % — SIGNIFICANT CHANGE UP (ref 0–6)
GLUCOSE BLDC GLUCOMTR-MCNC: 214 MG/DL — HIGH (ref 70–99)
GLUCOSE BLDC GLUCOMTR-MCNC: 295 MG/DL — HIGH (ref 70–99)
GLUCOSE BLDC GLUCOMTR-MCNC: 318 MG/DL — HIGH (ref 70–99)
GLUCOSE SERPL-MCNC: 239 MG/DL — HIGH (ref 70–99)
GRAM STN FLD: SIGNIFICANT CHANGE UP
HCT VFR BLD CALC: 32.4 % — LOW (ref 34.5–45)
HCV AB S/CO SERPL IA: 0.13 S/CO — SIGNIFICANT CHANGE UP (ref 0–0.99)
HCV AB SERPL-IMP: SIGNIFICANT CHANGE UP
HDLC SERPL-MCNC: 28 MG/DL — LOW
HGB BLD-MCNC: 10.6 G/DL — LOW (ref 11.5–15.5)
IANC: 4.32 K/UL — SIGNIFICANT CHANGE UP (ref 1.8–7.4)
IMM GRANULOCYTES NFR BLD AUTO: 0.6 % — SIGNIFICANT CHANGE UP (ref 0–1.5)
LEGIONELLA AG UR QL: NEGATIVE — SIGNIFICANT CHANGE UP
LIPID PNL WITH DIRECT LDL SERPL: 45 MG/DL — SIGNIFICANT CHANGE UP
LYMPHOCYTES # BLD AUTO: 1.49 K/UL — SIGNIFICANT CHANGE UP (ref 1–3.3)
LYMPHOCYTES # BLD AUTO: 23 % — SIGNIFICANT CHANGE UP (ref 13–44)
MCHC RBC-ENTMCNC: 29.2 PG — SIGNIFICANT CHANGE UP (ref 27–34)
MCHC RBC-ENTMCNC: 32.7 GM/DL — SIGNIFICANT CHANGE UP (ref 32–36)
MCV RBC AUTO: 89.3 FL — SIGNIFICANT CHANGE UP (ref 80–100)
MONOCYTES # BLD AUTO: 0.57 K/UL — SIGNIFICANT CHANGE UP (ref 0–0.9)
MONOCYTES NFR BLD AUTO: 8.8 % — SIGNIFICANT CHANGE UP (ref 2–14)
MRSA PCR RESULT.: SIGNIFICANT CHANGE UP
NEUTROPHILS # BLD AUTO: 4.32 K/UL — SIGNIFICANT CHANGE UP (ref 1.8–7.4)
NEUTROPHILS NFR BLD AUTO: 66.5 % — SIGNIFICANT CHANGE UP (ref 43–77)
NON HDL CHOLESTEROL: 60 MG/DL — SIGNIFICANT CHANGE UP
NRBC # BLD: 0 /100 WBCS — SIGNIFICANT CHANGE UP (ref 0–0)
NRBC # FLD: 0 K/UL — SIGNIFICANT CHANGE UP (ref 0–0)
PLATELET # BLD AUTO: 420 K/UL — HIGH (ref 150–400)
POTASSIUM SERPL-MCNC: 3.1 MMOL/L — LOW (ref 3.5–5.3)
POTASSIUM SERPL-SCNC: 3.1 MMOL/L — LOW (ref 3.5–5.3)
PROT SERPL-MCNC: 6.6 G/DL — SIGNIFICANT CHANGE UP (ref 6–8.3)
RBC # BLD: 3.63 M/UL — LOW (ref 3.8–5.2)
RBC # FLD: 12.3 % — SIGNIFICANT CHANGE UP (ref 10.3–14.5)
S AUREUS DNA NOSE QL NAA+PROBE: SIGNIFICANT CHANGE UP
SODIUM SERPL-SCNC: 134 MMOL/L — LOW (ref 135–145)
SPECIMEN SOURCE: SIGNIFICANT CHANGE UP
TRIGL SERPL-MCNC: 75 MG/DL — SIGNIFICANT CHANGE UP
WBC # BLD: 6.49 K/UL — SIGNIFICANT CHANGE UP (ref 3.8–10.5)
WBC # FLD AUTO: 6.49 K/UL — SIGNIFICANT CHANGE UP (ref 3.8–10.5)

## 2022-09-07 PROCEDURE — 99233 SBSQ HOSP IP/OBS HIGH 50: CPT

## 2022-09-07 PROCEDURE — 99232 SBSQ HOSP IP/OBS MODERATE 35: CPT

## 2022-09-07 RX ORDER — INSULIN GLARGINE 100 [IU]/ML
16 INJECTION, SOLUTION SUBCUTANEOUS AT BEDTIME
Refills: 0 | Status: DISCONTINUED | OUTPATIENT
Start: 2022-09-08 | End: 2022-09-09

## 2022-09-07 RX ORDER — POTASSIUM CHLORIDE 20 MEQ
40 PACKET (EA) ORAL EVERY 4 HOURS
Refills: 0 | Status: COMPLETED | OUTPATIENT
Start: 2022-09-07 | End: 2022-09-07

## 2022-09-07 RX ORDER — MAGNESIUM OXIDE 400 MG ORAL TABLET 241.3 MG
400 TABLET ORAL ONCE
Refills: 0 | Status: COMPLETED | OUTPATIENT
Start: 2022-09-07 | End: 2022-09-07

## 2022-09-07 RX ORDER — HUMAN INSULIN 100 [IU]/ML
8 INJECTION, SUSPENSION SUBCUTANEOUS ONCE
Refills: 0 | Status: COMPLETED | OUTPATIENT
Start: 2022-09-08 | End: 2022-09-08

## 2022-09-07 RX ORDER — INSULIN LISPRO 100/ML
6 VIAL (ML) SUBCUTANEOUS
Refills: 0 | Status: DISCONTINUED | OUTPATIENT
Start: 2022-09-07 | End: 2022-09-09

## 2022-09-07 RX ADMIN — Medication 4 UNIT(S): at 09:05

## 2022-09-07 RX ADMIN — Medication 40 MILLIEQUIVALENT(S): at 15:14

## 2022-09-07 RX ADMIN — Medication 650 MILLIGRAM(S): at 06:31

## 2022-09-07 RX ADMIN — Medication 6 UNIT(S): at 18:03

## 2022-09-07 RX ADMIN — Medication 3: at 18:02

## 2022-09-07 RX ADMIN — Medication 4 UNIT(S): at 12:51

## 2022-09-07 RX ADMIN — CEFTRIAXONE 100 MILLIGRAM(S): 500 INJECTION, POWDER, FOR SOLUTION INTRAMUSCULAR; INTRAVENOUS at 21:16

## 2022-09-07 RX ADMIN — Medication 10 MILLIGRAM(S): at 06:31

## 2022-09-07 RX ADMIN — ENOXAPARIN SODIUM 40 MILLIGRAM(S): 100 INJECTION SUBCUTANEOUS at 06:31

## 2022-09-07 RX ADMIN — CHLORHEXIDINE GLUCONATE 1 APPLICATION(S): 213 SOLUTION TOPICAL at 13:02

## 2022-09-07 RX ADMIN — Medication 4: at 12:50

## 2022-09-07 RX ADMIN — Medication 650 MILLIGRAM(S): at 07:30

## 2022-09-07 RX ADMIN — Medication 200 MILLIGRAM(S): at 09:04

## 2022-09-07 RX ADMIN — Medication 200 MILLIGRAM(S): at 15:13

## 2022-09-07 RX ADMIN — Medication 2: at 09:05

## 2022-09-07 RX ADMIN — CLOPIDOGREL BISULFATE 75 MILLIGRAM(S): 75 TABLET, FILM COATED ORAL at 12:57

## 2022-09-07 RX ADMIN — INSULIN GLARGINE 12 UNIT(S): 100 INJECTION, SOLUTION SUBCUTANEOUS at 09:07

## 2022-09-07 RX ADMIN — ATORVASTATIN CALCIUM 40 MILLIGRAM(S): 80 TABLET, FILM COATED ORAL at 21:15

## 2022-09-07 RX ADMIN — Medication 40 MILLIEQUIVALENT(S): at 19:47

## 2022-09-07 RX ADMIN — Medication 40 MILLIEQUIVALENT(S): at 23:01

## 2022-09-07 RX ADMIN — MAGNESIUM OXIDE 400 MG ORAL TABLET 400 MILLIGRAM(S): 241.3 TABLET ORAL at 12:58

## 2022-09-07 NOTE — PHYSICAL THERAPY INITIAL EVALUATION ADULT - GAIT DISTANCE, PT EVAL
A/P: 26yFemale s/p Repeat L Ankle I&D, Vac Change by Dr. NEAL Wallis on 09-17, with plastic surgery for flap harvest and closure with plastic surgery (9/2)  I discussed the patient's care with her [facility] advisor Carlie Chaidez regarding the patient's expected recovery.  The patient can start remote learning at [facility] while at rehab  The patients flap is maturing without.  Demarcation pending  Her inflammatory markers continue to be extremely elevated but does not correlate with signs of infection.  The patient's compartment syndrome status continues to cause muscle imbalance about the left lower extremity leading to increased equinus sedation and flexion contractures of the long flexors of the toes.  The patient has minimal to no dorsiflexion about her EHL and EDL.  The patient will require operative stabilization of her ankle fracture at 6 weeks post IV antibiotics.    Plan continues to be for flap elevation, revision open reduction internal fixation of trimalleolar ankle fracture malunion nonunion, syndesmosis, deltoid ligament repair versus reconstruction, Achilles tendon lengthening was done in conjunction with Dr. Dunbar from plastic surgery, possible ringed ex fix to correct equinus   PATIENT REQUIRES PRAFO PRIOR TO DISCHARGE TO REHAB  PLAN FOR READMISSION ON OCTOBER 16TH MORNING FOR RE-EVAL, ANKLE AND FOOT XRAYS, FOR OR ON OCTOBER 17TH  - PICC placed, placed for IV abx x 6 weeks   - Continue elevation, defer dangle protocol per plastics   - Dressing evaluation and drain management per plastics team   - OR cx - staph aureus in 1 culture - continue abx as planned per ID.   - Pain Control  - Post op abx: Ceftriaxone + Nafcillin  - DVT ppx: Lovenox  - WBS: NWB LLE  - Plastics primary, ortho to peripherally follow 100 feet

## 2022-09-07 NOTE — PHARMACOTHERAPY INTERVENTION NOTE - COMMENTS
Pt educated on A1c, goal A1c, S&S hyperglycemia, basal insulin pen administration, hypoglycemia and treatment, healthy plate, exercise, goal BG, and when to check BG; patient could not successfully perform return demonstration with insulin pen due to poor vision and dexterity issues (left shoulder/arm). Pt also adamant about not using insulin when she gets home. Pt doesn't have concrete plan to help BG (does not check BG, not changing diet, but may exercise more).  Pt is agreeable to allowing us to talk with her family - will see if they would be willing to help her with insulin or if alterative endocrine plan is needed.  Pt educated on A1c, goal A1c, S&S hyperglycemia, basal insulin pen administration, hypoglycemia and treatment, healthy plate, exercise, goal BG, and when to check BG; patient could not successfully perform return demonstration with insulin pen due to poor vision and dexterity issues (left shoulder/arm). Pt also adamant about not using insulin when she gets home. Pt doesn't have concrete plan to help BG (does not check BG, not changing diet, but may exercise more).  Pt is agreeable to allowing us to talk with her family - will see if they would be willing to help her with insulin or if alterative endocrine plan is needed - tried to call family, no answer and voicemail not set up.

## 2022-09-07 NOTE — PHYSICAL THERAPY INITIAL EVALUATION ADULT - PERTINENT HX OF CURRENT PROBLEM, REHAB EVAL
72F PMHx HTN, HLD, DM (on oral meds) who presents c/o productive cough. Patient states she went to [place] recently (2-3 weeks ago) for her son and daughter in law's wedding. She states the cough started while there but was not bothersome to her and not too severe. She states that when she got home, about 1 week ago, cough started to get more severe and became productive of dark greenish sputum. She reports that her daughter in law who is a nurse at [employer] [facility] [employer] checked on her yesterday and found her oxygen levels to be low (per ED documentation - pulse ox 85%) and that she seemed like she was having difficulty breathing while sleeping. She reports mild frontal headache with bouts of coughing but no headache now.

## 2022-09-07 NOTE — PROGRESS NOTE ADULT - SUBJECTIVE AND OBJECTIVE BOX
Chief Complaint: DM 2 with hyperglycemia     History: Patient seen at bedside. Reports she is eating meals, denies n/v, denies s/s of hypoglycemia  Discussed outpatient management of diabetes - reviewed that her A1c is elevated (11.9%) and we are recommending to start insulin for discharge  Patient reports she will take insulin here in the hospital, but NOT at home  When asked why she is refusing insulin for home, patient reports that her primary doctor told her not to take it, that she would "go low" with insulin, and to take pills instead. Explained that PO medications would likely not be enough given her A1c and hyperglycemia here - patient states "the pills work for me, I just ran out of them while in [place] so I haven't been taking my medicine for 2 weeks, that's why my blood sugar is high"  Patient does not check FSBG at home, reports she had a glucometer before but it is broken, needs a new one    MEDICATIONS  (STANDING):  atorvastatin 40 milliGRAM(s) Oral at bedtime  cefTRIAXone   IVPB 1000 milliGRAM(s) IV Intermittent every 24 hours  chlorhexidine 2% Cloths 1 Application(s) Topical daily  clopidogrel Tablet 75 milliGRAM(s) Oral daily  dextrose 5%. 1000 milliLiter(s) (100 mL/Hr) IV Continuous <Continuous>  dextrose 5%. 1000 milliLiter(s) (50 mL/Hr) IV Continuous <Continuous>  dextrose 50% Injectable 25 Gram(s) IV Push once  dextrose 50% Injectable 12.5 Gram(s) IV Push once  dextrose 50% Injectable 25 Gram(s) IV Push once  enalapril 10 milliGRAM(s) Oral daily  enoxaparin Injectable 40 milliGRAM(s) SubCutaneous every 24 hours  influenza  Vaccine (HIGH DOSE) 0.7 milliLiter(s) IntraMuscular once  insulin glargine Injectable (LANTUS) 12 Unit(s) SubCutaneous every morning  insulin lispro (ADMELOG) corrective regimen sliding scale   SubCutaneous three times a day before meals  insulin lispro (ADMELOG) corrective regimen sliding scale   SubCutaneous at bedtime  insulin lispro Injectable (ADMELOG) 4 Unit(s) SubCutaneous three times a day before meals  polyethylene glycol 3350 17 Gram(s) Oral daily  potassium chloride    Tablet ER 40 milliEquivalent(s) Oral every 4 hours  sodium chloride 0.9%. 1000 milliLiter(s) (60 mL/Hr) IV Continuous <Continuous>    MEDICATIONS  (PRN):  acetaminophen     Tablet .. 650 milliGRAM(s) Oral every 6 hours PRN Temp greater or equal to 38C (100.4F), Moderate Pain (4 - 6), Severe Pain (7 - 10)  dextrose Oral Gel 15 Gram(s) Oral once PRN Blood Glucose LESS THAN 70 milliGRAM(s)/deciliter  guaiFENesin Oral Liquid (Sugar-Free) 200 milliGRAM(s) Oral every 6 hours PRN Cough  senna 2 Tablet(s) Oral at bedtime PRN Constipation    No Known Allergies    Review of Systems:  Cardiovascular: No chest pain  Respiratory: No SOB  GI: No nausea, vomiting  Endocrine: no hypoglycemia     PHYSICAL EXAM:  VITALS: T(C): 37.1 (09-07-22 @ 12:25)  T(F): 98.8 (09-07-22 @ 12:25), Max: 99 (09-06-22 @ 21:39)  HR: 92 (09-07-22 @ 12:25) (89 - 98)  BP: 170/81 (09-07-22 @ 12:25) (145/74 - 170/81)  RR:  (16 - 18)  SpO2:  (96% - 99%)  Wt(kg): --  GENERAL: NAD  EYES: No proptosis, no lid lag, anicteric  HEENT:  Atraumatic, Normocephalic, moist mucous membranes  RESPIRATORY: unlabored respirations     CAPILLARY BLOOD GLUCOSE    POCT Blood Glucose.: 318 mg/dL (07 Sep 2022 12:41)  POCT Blood Glucose.: 221 mg/dL (07 Sep 2022 08:27)  POCT Blood Glucose.: 165 mg/dL (06 Sep 2022 22:08)  POCT Blood Glucose.: 214 mg/dL (06 Sep 2022 17:20)      09-07    134<L>  |  96<L>  |  5<L>  ----------------------------<  239<H>  3.1<L>   |  27  |  0.32<L>    eGFR: 110    Ca    8.5      09-07  Mg     1.90     09-07  Phos  2.9     09-07    TPro  6.6  /  Alb  2.8<L>  /  TBili  0.5  /  DBili  x   /  AST  22  /  ALT  14  /  AlkPhos  64  09-07      Thyroid Function Tests:  09-05 @ 21:45 TSH 0.98 FreeT4 -- T3 -- Anti TPO -- Anti Thyroglobulin Ab -- TSI --      A1C with Estimated Average Glucose Result: 11.9 % (09-06-22 @ 10:10)    Diet, Consistent Carbohydrate/No Snacks:   Low Sodium  [place] Vegetarian (Accepts Fish) (09-06-22 @ 14:06)

## 2022-09-07 NOTE — PHYSICAL THERAPY INITIAL EVALUATION ADULT - GAIT PATTERN USED, PT EVAL
Pt presents with cataracts, L eye significantly worse than right.  Pt reports falling while negotiating stairs.  Pts fall history most likely do to poor depth preception as opposed to musculoskeltal strength deficits/2-point gait

## 2022-09-07 NOTE — PROGRESS NOTE ADULT - ASSESSMENT
71 yo F with PMH of HTN, HLD, uncontrolled DM2 presenting with cough, found to have pneumonia. Endocrine consulted for DM2 management.    1. Type 2 Diabetes Mellitus, uncontrolled  HbA1c  11.9%  Home Regimen: Metformin 1000 mg PO BID, Januvia 100 mg PO daily    While inpatient:  BG target 100-180 mg/dl  Increase Lantus to 16 units SQ qAM  Increase Admelog to 6 units SQ TID before meals (Hold if NPO/not eating meal)   Continue Admelog LOW dose correctional scale before meals, low dose at bedtime   Check BG before meals and bedtime  Consistent carbohydrate diet, RD consult  DM Education - review glucometer use. Patient refusing insulin in spite of education    Discharge Plan:  With her current A1c, basal/bolus insulin would be optimal. However, patient is refusing all insulin, is NOT willing to take even one injection per day  Therefore will provide recommendations for oral agents. Patient should make diet and lifestyle changes (low carb diet, exercise) and followup as outpatient for further titration and monitoring.  Likely DC plan - resume Metformin 1000 mg PO BID + Januvia 100 mg PO daily + ADD Pioglitazone 15 mg PO daily    2. Hypertension  BP goal <130/80  Above goal on Enalapril  Titration as per primary team    3. Hyperlipidemia  Check fasting lipid panel  Continue Atorvastatin 40 mg daily if no contraindications     Rita Singleton  Nurse Practitioner  Division of Endocrinology & Diabetes  In house pager #[phone]/long range pager #[phone]    If before 9AM or after 6PM, or on weekends/holidays, please call endocrine answering service for assistance ([phone]).  For nonurgent matters email [email or web address] for assistance.

## 2022-09-07 NOTE — PHYSICAL THERAPY INITIAL EVALUATION ADULT - ADDITIONAL COMMENTS
Pt lives in a house with a few steps to enter with her son and daughter in law.  Pt reprots coming back from [place] and has had 3 feels while in [place].  Pt is fully independent and does not use DME.

## 2022-09-07 NOTE — PROGRESS NOTE ADULT - SUBJECTIVE AND OBJECTIVE BOX
[facility] Division of Hospital Medicine  Mariama Wheat MD  Pager (M-F, 8A-5P): [phone]  Other Times:      Patient is a 72y old  Female who presents with a chief complaint of Cough and Shortness of breath (06 Sep 2022 16:32)      SUBJECTIVE / OVERNIGHT EVENTS:      MEDICATIONS  (STANDING):  atorvastatin 40 milliGRAM(s) Oral at bedtime  azithromycin   Tablet 500 milliGRAM(s) Oral at bedtime  cefTRIAXone   IVPB 1000 milliGRAM(s) IV Intermittent every 24 hours  chlorhexidine 2% Cloths 1 Application(s) Topical daily  clopidogrel Tablet 75 milliGRAM(s) Oral daily  dextrose 5%. 1000 milliLiter(s) (100 mL/Hr) IV Continuous <Continuous>  dextrose 5%. 1000 milliLiter(s) (50 mL/Hr) IV Continuous <Continuous>  dextrose 50% Injectable 25 Gram(s) IV Push once  dextrose 50% Injectable 12.5 Gram(s) IV Push once  dextrose 50% Injectable 25 Gram(s) IV Push once  enalapril 10 milliGRAM(s) Oral daily  enoxaparin Injectable 40 milliGRAM(s) SubCutaneous every 24 hours  influenza  Vaccine (HIGH DOSE) 0.7 milliLiter(s) IntraMuscular once  insulin glargine Injectable (LANTUS) 12 Unit(s) SubCutaneous every morning  insulin lispro (ADMELOG) corrective regimen sliding scale   SubCutaneous three times a day before meals  insulin lispro (ADMELOG) corrective regimen sliding scale   SubCutaneous at bedtime  insulin lispro Injectable (ADMELOG) 4 Unit(s) SubCutaneous three times a day before meals  polyethylene glycol 3350 17 Gram(s) Oral daily  sodium chloride 0.9%. 1000 milliLiter(s) (60 mL/Hr) IV Continuous <Continuous>    MEDICATIONS  (PRN):  acetaminophen     Tablet .. 650 milliGRAM(s) Oral every 6 hours PRN Temp greater or equal to 38C (100.4F), Moderate Pain (4 - 6), Severe Pain (7 - 10)  dextrose Oral Gel 15 Gram(s) Oral once PRN Blood Glucose LESS THAN 70 milliGRAM(s)/deciliter  guaiFENesin Oral Liquid (Sugar-Free) 200 milliGRAM(s) Oral every 6 hours PRN Cough  senna 2 Tablet(s) Oral at bedtime PRN Constipation      CAPILLARY BLOOD GLUCOSE      POCT Blood Glucose.: 221 mg/dL (07 Sep 2022 08:27)  POCT Blood Glucose.: 165 mg/dL (06 Sep 2022 22:08)  POCT Blood Glucose.: 214 mg/dL (06 Sep 2022 17:20)  POCT Blood Glucose.: 155 mg/dL (06 Sep 2022 12:31)    I&O's Summary    06 Sep 2022 07:01  -  07 Sep 2022 07:00  --------------------------------------------------------  IN: 540 mL / OUT: 0 mL / NET: 540 mL        PHYSICAL EXAM:  Vital Signs Last 24 Hrs  T(C): 36.9 (07 Sep 2022 06:40), Max: 37.3 (06 Sep 2022 14:03)  T(F): 98.5 (07 Sep 2022 06:40), Max: 99.1 (06 Sep 2022 14:03)  HR: 89 (07 Sep 2022 06:40) (89 - 103)  BP: 145/74 (07 Sep 2022 06:40) (145/74 - 163/62)  BP(mean): --  RR: 18 (07 Sep 2022 06:40) (16 - 18)  SpO2: 96% (07 Sep 2022 06:40) (96% - 99%)    Parameters below as of 07 Sep 2022 06:40  Patient On (Oxygen Delivery Method): room air        CONSTITUTIONAL: NAD, well-developed, well-groomed  EYES: EOMI; conjunctiva and sclera clear  ENMT: Moist oral mucosa  RESPIRATORY: Normal respiratory effort; lungs are clear to auscultation bilaterally  CARDIOVASCULAR: Regular rate and rhythm, normal S1 and S2, no murmur; No lower extremity edema  ABDOMEN: Nontender to palpation, normoactive bowel sounds, no rebound/guarding  MUSCULOSKELETAL:   no clubbing or cyanosis of digits; no joint swelling or tenderness to palpation  PSYCH: A+O to person, place, and time; affect appropriate  NEUROLOGY: CN 2-12 are intact and symmetric; no gross sensory deficits   SKIN: No rashes; no palpable lesions    LABS:                        10.6   6.49  )-----------( 420      ( 07 Sep 2022 07:38 )             32.4     09-07    134<L>  |  96<L>  |  5<L>  ----------------------------<  239<H>  3.1<L>   |  27  |  0.32<L>    Ca    8.5      07 Sep 2022 08:01  Phos  1.4     -  Mg     1.50     -    TPro  6.6  /  Alb  2.8<L>  /  TBili  0.5  /  DBili  x   /  AST  22  /  ALT  14  /  AlkPhos  64  09-07    PT/INR - ( 05 Sep 2022 21:45 )   PT: 13.0 sec;   INR: 1.12 ratio         PTT - ( 05 Sep 2022 21:45 )  PTT:28.2 sec      Urinalysis Basic - ( 06 Sep 2022 13:37 )    Color: Yellow / Appearance: Clear / S.044 / pH: x  Gluc: x / Ketone: Large  / Bili: Negative / Urobili: <2 mg/dL   Blood: x / Protein: 30 mg/dL / Nitrite: Negative   Leuk Esterase: Negative / RBC: 5-10 /HPF / WBC 5 /HPF   Sq Epi: x / Non Sq Epi: Occasional / Bacteria: Negative        Culture - Sputum (collected 06 Sep 2022 20:54)  Source: .Sputum Sputum  Gram Stain (07 Sep 2022 04:45):    Few polymorphonuclear leukocytes per low power field    Rare Squamous epithelial cells per low power field    Few Gram positive cocci in pairs seen per oil power field    Few Gram Variable Rods seen per oil power field    Culture - Blood (collected 05 Sep 2022 22:30)  Source: .Blood Blood  Preliminary Report (07 Sep 2022 04:02):    No growth to date.    Culture - Blood (collected 05 Sep 2022 22:15)  Source: .Blood Blood-Peripheral  Preliminary Report (07 Sep 2022 04:02):    No growth to date.      RADIOLOGY & ADDITIONAL TESTS:  Results Reviewed:   Imaging Personally Reviewed:  Electrocardiogram Personally Reviewed:    COORDINATION OF CARE:  Care Discussed with Consultants/Other Providers [Y/N]: Y  Prior or Outpatient Records Reviewed [Y/N]: Y   [facility] Division of Hospital Medicine  Mariama Wheat MD  Pager (M-F, 8A-5P): [phone]  Other Times:      Patient is a 72y old  Female who presents with a chief complaint of Cough and Shortness of breath (06 Sep 2022 16:32)      SUBJECTIVE / OVERNIGHT EVENTS:  Pt overall feels improved - states her breathing has improved.  She continues to have a cough.      MEDICATIONS  (STANDING):  atorvastatin 40 milliGRAM(s) Oral at bedtime  azithromycin   Tablet 500 milliGRAM(s) Oral at bedtime  cefTRIAXone   IVPB 1000 milliGRAM(s) IV Intermittent every 24 hours  chlorhexidine 2% Cloths 1 Application(s) Topical daily  clopidogrel Tablet 75 milliGRAM(s) Oral daily  dextrose 5%. 1000 milliLiter(s) (100 mL/Hr) IV Continuous <Continuous>  dextrose 5%. 1000 milliLiter(s) (50 mL/Hr) IV Continuous <Continuous>  dextrose 50% Injectable 25 Gram(s) IV Push once  dextrose 50% Injectable 12.5 Gram(s) IV Push once  dextrose 50% Injectable 25 Gram(s) IV Push once  enalapril 10 milliGRAM(s) Oral daily  enoxaparin Injectable 40 milliGRAM(s) SubCutaneous every 24 hours  influenza  Vaccine (HIGH DOSE) 0.7 milliLiter(s) IntraMuscular once  insulin glargine Injectable (LANTUS) 12 Unit(s) SubCutaneous every morning  insulin lispro (ADMELOG) corrective regimen sliding scale   SubCutaneous three times a day before meals  insulin lispro (ADMELOG) corrective regimen sliding scale   SubCutaneous at bedtime  insulin lispro Injectable (ADMELOG) 4 Unit(s) SubCutaneous three times a day before meals  polyethylene glycol 3350 17 Gram(s) Oral daily  sodium chloride 0.9%. 1000 milliLiter(s) (60 mL/Hr) IV Continuous <Continuous>    MEDICATIONS  (PRN):  acetaminophen     Tablet .. 650 milliGRAM(s) Oral every 6 hours PRN Temp greater or equal to 38C (100.4F), Moderate Pain (4 - 6), Severe Pain (7 - 10)  dextrose Oral Gel 15 Gram(s) Oral once PRN Blood Glucose LESS THAN 70 milliGRAM(s)/deciliter  guaiFENesin Oral Liquid (Sugar-Free) 200 milliGRAM(s) Oral every 6 hours PRN Cough  senna 2 Tablet(s) Oral at bedtime PRN Constipation      CAPILLARY BLOOD GLUCOSE      POCT Blood Glucose.: 221 mg/dL (07 Sep 2022 08:27)  POCT Blood Glucose.: 165 mg/dL (06 Sep 2022 22:08)  POCT Blood Glucose.: 214 mg/dL (06 Sep 2022 17:20)  POCT Blood Glucose.: 155 mg/dL (06 Sep 2022 12:31)    I&O's Summary    06 Sep 2022 07:01  -  07 Sep 2022 07:00  --------------------------------------------------------  IN: 540 mL / OUT: 0 mL / NET: 540 mL        PHYSICAL EXAM:  Vital Signs Last 24 Hrs  T(C): 36.9 (07 Sep 2022 06:40), Max: 37.3 (06 Sep 2022 14:03)  T(F): 98.5 (07 Sep 2022 06:40), Max: 99.1 (06 Sep 2022 14:03)  HR: 89 (07 Sep 2022 06:40) (89 - 103)  BP: 145/74 (07 Sep 2022 06:40) (145/74 - 163/62)  BP(mean): --  RR: 18 (07 Sep 2022 06:40) (16 - 18)  SpO2: 96% (07 Sep 2022 06:40) (96% - 99%)    Parameters below as of 07 Sep 2022 06:40  Patient On (Oxygen Delivery Method): room air        CONSTITUTIONAL: NAD, well-developed, well-groomed  EYES: EOMI; conjunctiva and sclera clear  ENMT: Moist oral mucosa  RESPIRATORY: Normal respiratory effort; lungs are clear to auscultation bilaterally  CARDIOVASCULAR: Regular rate and rhythm, normal S1 and S2, no murmur; No lower extremity edema  ABDOMEN: Nontender to palpation, normoactive bowel sounds, no rebound/guarding  MUSCULOSKELETAL:   no clubbing or cyanosis of digits; no joint swelling or tenderness to palpation  PSYCH: A+O to person, place, and time; affect appropriate  NEUROLOGY: CN 2-12 are intact and symmetric; no gross sensory deficits   SKIN: No rashes; no palpable lesions    LABS:                        10.6   6.49  )-----------( 420      ( 07 Sep 2022 07:38 )             32.4     09-    134<L>  |  96<L>  |  5<L>  ----------------------------<  239<H>  3.1<L>   |  27  |  0.32<L>    Ca    8.5      07 Sep 2022 08:01  Phos  1.4     -  Mg     1.50     -    TPro  6.6  /  Alb  2.8<L>  /  TBili  0.5  /  DBili  x   /  AST  22  /  ALT  14  /  AlkPhos  64      PT/INR - ( 05 Sep 2022 21:45 )   PT: 13.0 sec;   INR: 1.12 ratio         PTT - ( 05 Sep 2022 21:45 )  PTT:28.2 sec      Urinalysis Basic - ( 06 Sep 2022 13:37 )    Color: Yellow / Appearance: Clear / S.044 / pH: x  Gluc: x / Ketone: Large  / Bili: Negative / Urobili: <2 mg/dL   Blood: x / Protein: 30 mg/dL / Nitrite: Negative   Leuk Esterase: Negative / RBC: 5-10 /HPF / WBC 5 /HPF   Sq Epi: x / Non Sq Epi: Occasional / Bacteria: Negative        Culture - Sputum (collected 06 Sep 2022 20:54)  Source: .Sputum Sputum  Gram Stain (07 Sep 2022 04:45):    Few polymorphonuclear leukocytes per low power field    Rare Squamous epithelial cells per low power field    Few Gram positive cocci in pairs seen per oil power field    Few Gram Variable Rods seen per oil power field    Culture - Blood (collected 05 Sep 2022 22:30)  Source: .Blood Blood  Preliminary Report (07 Sep 2022 04:02):    No growth to date.    Culture - Blood (collected 05 Sep 2022 22:15)  Source: .Blood Blood-Peripheral  Preliminary Report (07 Sep 2022 04:02):    No growth to date.      RADIOLOGY & ADDITIONAL TESTS:  Results Reviewed:   Imaging Personally Reviewed:  Electrocardiogram Personally Reviewed:    COORDINATION OF CARE:  Care Discussed with Consultants/Other Providers [Y/N]: Y  Prior or Outpatient Records Reviewed [Y/N]: Y

## 2022-09-08 LAB
ANION GAP SERPL CALC-SCNC: 9 MMOL/L — SIGNIFICANT CHANGE UP (ref 7–14)
BUN SERPL-MCNC: 4 MG/DL — LOW (ref 7–23)
C PEPTIDE SERPL-MCNC: 1 NG/ML — LOW (ref 1.1–4.4)
CALCIUM SERPL-MCNC: 9.1 MG/DL — SIGNIFICANT CHANGE UP (ref 8.4–10.5)
CHLORIDE SERPL-SCNC: 101 MMOL/L — SIGNIFICANT CHANGE UP (ref 98–107)
CHOLEST SERPL-MCNC: 79 MG/DL — SIGNIFICANT CHANGE UP
CO2 SERPL-SCNC: 27 MMOL/L — SIGNIFICANT CHANGE UP (ref 22–31)
CREAT SERPL-MCNC: 0.33 MG/DL — LOW (ref 0.5–1.3)
CULTURE RESULTS: SIGNIFICANT CHANGE UP
EGFR: 109 ML/MIN/1.73M2 — SIGNIFICANT CHANGE UP
GLUCOSE BLDC GLUCOMTR-MCNC: 218 MG/DL — HIGH (ref 70–99)
GLUCOSE BLDC GLUCOMTR-MCNC: 226 MG/DL — HIGH (ref 70–99)
GLUCOSE BLDC GLUCOMTR-MCNC: 248 MG/DL — HIGH (ref 70–99)
GLUCOSE BLDC GLUCOMTR-MCNC: 254 MG/DL — HIGH (ref 70–99)
GLUCOSE BLDC GLUCOMTR-MCNC: 343 MG/DL — HIGH (ref 70–99)
GLUCOSE SERPL-MCNC: 251 MG/DL — HIGH (ref 70–99)
HCT VFR BLD CALC: 33 % — LOW (ref 34.5–45)
HDLC SERPL-MCNC: 29 MG/DL — LOW
HGB BLD-MCNC: 11 G/DL — LOW (ref 11.5–15.5)
LIPID PNL WITH DIRECT LDL SERPL: 36 MG/DL — SIGNIFICANT CHANGE UP
MAGNESIUM SERPL-MCNC: 1.7 MG/DL — SIGNIFICANT CHANGE UP (ref 1.6–2.6)
MCHC RBC-ENTMCNC: 29.8 PG — SIGNIFICANT CHANGE UP (ref 27–34)
MCHC RBC-ENTMCNC: 33.3 GM/DL — SIGNIFICANT CHANGE UP (ref 32–36)
MCV RBC AUTO: 89.4 FL — SIGNIFICANT CHANGE UP (ref 80–100)
NON HDL CHOLESTEROL: 50 MG/DL — SIGNIFICANT CHANGE UP
NRBC # BLD: 0 /100 WBCS — SIGNIFICANT CHANGE UP (ref 0–0)
NRBC # FLD: 0 K/UL — SIGNIFICANT CHANGE UP (ref 0–0)
PHOSPHATE SERPL-MCNC: 2.4 MG/DL — LOW (ref 2.5–4.5)
PLATELET # BLD AUTO: 442 K/UL — HIGH (ref 150–400)
POTASSIUM SERPL-MCNC: 4.8 MMOL/L — SIGNIFICANT CHANGE UP (ref 3.5–5.3)
POTASSIUM SERPL-SCNC: 4.8 MMOL/L — SIGNIFICANT CHANGE UP (ref 3.5–5.3)
RBC # BLD: 3.69 M/UL — LOW (ref 3.8–5.2)
RBC # FLD: 12.2 % — SIGNIFICANT CHANGE UP (ref 10.3–14.5)
SODIUM SERPL-SCNC: 137 MMOL/L — SIGNIFICANT CHANGE UP (ref 135–145)
SPECIMEN SOURCE: SIGNIFICANT CHANGE UP
TRIGL SERPL-MCNC: 71 MG/DL — SIGNIFICANT CHANGE UP
WBC # BLD: 6.56 K/UL — SIGNIFICANT CHANGE UP (ref 3.8–10.5)
WBC # FLD AUTO: 6.56 K/UL — SIGNIFICANT CHANGE UP (ref 3.8–10.5)

## 2022-09-08 PROCEDURE — 99233 SBSQ HOSP IP/OBS HIGH 50: CPT

## 2022-09-08 PROCEDURE — 93970 EXTREMITY STUDY: CPT | Mod: 26

## 2022-09-08 RX ORDER — SODIUM,POTASSIUM PHOSPHATES 278-250MG
1 POWDER IN PACKET (EA) ORAL ONCE
Refills: 0 | Status: COMPLETED | OUTPATIENT
Start: 2022-09-08 | End: 2022-09-08

## 2022-09-08 RX ORDER — ENOXAPARIN SODIUM 100 MG/ML
16 INJECTION SUBCUTANEOUS
Qty: 5 | Refills: 0
Start: 2022-09-08

## 2022-09-08 RX ADMIN — CLOPIDOGREL BISULFATE 75 MILLIGRAM(S): 75 TABLET, FILM COATED ORAL at 12:44

## 2022-09-08 RX ADMIN — ENOXAPARIN SODIUM 40 MILLIGRAM(S): 100 INJECTION SUBCUTANEOUS at 06:14

## 2022-09-08 RX ADMIN — Medication 2: at 12:42

## 2022-09-08 RX ADMIN — Medication 200 MILLIGRAM(S): at 21:28

## 2022-09-08 RX ADMIN — Medication 6 UNIT(S): at 17:59

## 2022-09-08 RX ADMIN — HUMAN INSULIN 8 UNIT(S): 100 INJECTION, SUSPENSION SUBCUTANEOUS at 09:35

## 2022-09-08 RX ADMIN — INSULIN GLARGINE 16 UNIT(S): 100 INJECTION, SOLUTION SUBCUTANEOUS at 21:26

## 2022-09-08 RX ADMIN — CHLORHEXIDINE GLUCONATE 1 APPLICATION(S): 213 SOLUTION TOPICAL at 12:44

## 2022-09-08 RX ADMIN — Medication 2: at 17:59

## 2022-09-08 RX ADMIN — Medication 650 MILLIGRAM(S): at 21:34

## 2022-09-08 RX ADMIN — Medication 6 UNIT(S): at 12:43

## 2022-09-08 RX ADMIN — Medication 6 UNIT(S): at 09:07

## 2022-09-08 RX ADMIN — Medication 650 MILLIGRAM(S): at 22:04

## 2022-09-08 RX ADMIN — CEFTRIAXONE 100 MILLIGRAM(S): 500 INJECTION, POWDER, FOR SOLUTION INTRAMUSCULAR; INTRAVENOUS at 21:40

## 2022-09-08 RX ADMIN — Medication 1 TABLET(S): at 12:42

## 2022-09-08 RX ADMIN — POLYETHYLENE GLYCOL 3350 17 GRAM(S): 17 POWDER, FOR SOLUTION ORAL at 12:44

## 2022-09-08 RX ADMIN — Medication 3: at 09:07

## 2022-09-08 RX ADMIN — Medication 10 MILLIGRAM(S): at 06:14

## 2022-09-08 RX ADMIN — ATORVASTATIN CALCIUM 40 MILLIGRAM(S): 80 TABLET, FILM COATED ORAL at 21:27

## 2022-09-08 NOTE — PROVIDER CONTACT NOTE (OTHER) - BACKGROUND
[facility] notified RN that prior AUTH needed to be completed for medications (insulin specifically) to be covered. RN discussed with Patients son.

## 2022-09-08 NOTE — DISCHARGE NOTE PROVIDER - HOSPITAL COURSE
72 F PMHx HTN, HLD, DM p/w productive cough. Pt states she went to [place] recently (2-3 weeks ago) for her son and daughter in law's wedding. She states the cough started while there but was not bothersome to her and not too severe. She states that when she got home, about 1 week ago, cough started to get more severe and became productive of dark greenish sputum. She reports that her daughter in law who is a nurse at [employer] checked on her yesterday and found her oxygen levels to be low (per ED documentation - pulse ox 85%) and that she seemed like she was having difficulty breathing while sleeping. She reports mild frontal headache with bouts of coughing but no headache now. She admits chronic external hemorrhoid that she is concerned about, with mild constipation - last BM yesterday. She admits chronic and mild L shoulder pain with difficulty raising left arm. She admits having bilateral cataract surgery but she feels left cataract has returned which is bothersome to her. Of note, per chart review, she reportedly fell 3 times while in [place]. She states that she tripped and missed a step each time and remembers each fall - no LOC and she denies head trauma (though per ED chart review, she did endorse hitting her head). She denies any injuries as a result of the falls. In the ED, she was febrile to 100.3F but stable on room air. D-dimer was elevated to 947 and CTA was negative for PE but did show multifocal pneumonia. CTH negative. Her AG was 18 and sugars in the 300s, concerning for mild DKA and was given Lantus 12 units and IVF. She was given CTX and Azithro for PNA, Urine Legionella negative, AZT d'trino. Speech consulted, recommend regular.Pt continued on CTX with improvement. BCx negative. Complete course of Abx with Augmentin x2 more days.     Endocrine consulted for DM management. Insulin regimen adjusted with improvement. Insulin reviewed with family member (daughter-in-law) who is a RN who will administer insulin for pt at home. Upon discharge, pt to continue with Lantus, Metformin and Januvia     Reports swelling of Rt thigh. Not currently swollen. Doppler LE negative for DVT.            Problem/Plan - 5:  ·  Problem: External hemorrhoid.   ·  Plan: - would recommend close outpatient follow up with GI referral   - keep stool soft with miralax and senna PRN.         Problem/Plan - 7:  ·  Problem: Hyperlipidemia.   ·  Plan: - lipid profile reviewed   - continue home lipitor 40.     Problem/Plan - 8:  ·  Problem: Hypertension.   ·  Plan: - BP mildly elevated  - home enalapril 10mg daily.        Spoke with attending, Dr. Wheat, pt is medically stable for discharge to home      72 F PMHx HTN, HLD, DM p/w productive cough. Pt states she went to [place] recently (2-3 weeks ago) for her son and daughter in law's wedding. She states the cough started while there but was not bothersome to her and not too severe. She states that when she got home, about 1 week ago, cough started to get more severe and became productive of dark greenish sputum. She reports that her daughter in law who is a nurse at [employer] checked on her yesterday and found her oxygen levels to be low (per ED documentation - pulse ox 85%) and that she seemed like she was having difficulty breathing while sleeping. She reports mild frontal headache with bouts of coughing but no headache now. She admits chronic external hemorrhoid that she is concerned about, with mild constipation - last BM yesterday. She admits chronic and mild L shoulder pain with difficulty raising left arm. She admits having bilateral cataract surgery but she feels left cataract has returned which is bothersome to her. Of note, per chart review, she reportedly fell 3 times while in [place]. She states that she tripped and missed a step each time and remembers each fall - no LOC and she denies head trauma (though per ED chart review, she did endorse hitting her head). She denies any injuries as a result of the falls. In the ED, she was febrile to 100.3F but stable on room air. D-dimer was elevated to 947 and CTA was negative for PE but did show multifocal pneumonia. CTH negative. Her AG was 18 and sugars in the 300s, concerning for mild DKA and was given Lantus 12 units and IVF. She was given CTX and Azithro for PNA, Urine Legionella negative, AZT d'trino. Speech consulted, recommend regular.Pt continued on CTX with improvement. BCx negative. Complete course of Abx with Augmentin x2 more days.     Endocrine consulted for DM management. Insulin regimen adjusted with improvement. Insulin reviewed with family member (daughter-in-law) who is a RN who will administer insulin for pt at home. Upon discharge, pt to continue with Lantus, Metformin and Januvia     Reports swelling of Rt thigh. Not currently swollen. Doppler LE negative for DVT.     Spoke with attending, Dr. Wheat, pt is medically stable for discharge to home

## 2022-09-08 NOTE — PROVIDER CONTACT NOTE (OTHER) - RECOMMENDATIONS
Please discuss with Provider and social work tomorrow medication authorization so discharge can be planned for 9/9

## 2022-09-08 NOTE — DISCHARGE NOTE PROVIDER - PROVIDER TOKENS
FREE:[LAST:[Endocrinology Clinic],PHONE:[phone]],FAX:[(   )    -],ADDRESS:[[phone] [address]]]

## 2022-09-08 NOTE — PROGRESS NOTE ADULT - SUBJECTIVE AND OBJECTIVE BOX
[facility] Division of Hospital Medicine  Mariama Wheat MD  Pager (M-F, 8A-5P): [phone]  Other Times:      Patient is a 72y old  Female who presents with a chief complaint of Cough and Shortness of breath (08 Sep 2022 09:16)      SUBJECTIVE / OVERNIGHT EVENTS:      MEDICATIONS  (STANDING):  atorvastatin 40 milliGRAM(s) Oral at bedtime  cefTRIAXone   IVPB 1000 milliGRAM(s) IV Intermittent every 24 hours  chlorhexidine 2% Cloths 1 Application(s) Topical daily  clopidogrel Tablet 75 milliGRAM(s) Oral daily  dextrose 5%. 1000 milliLiter(s) (100 mL/Hr) IV Continuous <Continuous>  dextrose 5%. 1000 milliLiter(s) (50 mL/Hr) IV Continuous <Continuous>  dextrose 50% Injectable 25 Gram(s) IV Push once  dextrose 50% Injectable 12.5 Gram(s) IV Push once  dextrose 50% Injectable 25 Gram(s) IV Push once  enalapril 10 milliGRAM(s) Oral daily  enoxaparin Injectable 40 milliGRAM(s) SubCutaneous every 24 hours  influenza  Vaccine (HIGH DOSE) 0.7 milliLiter(s) IntraMuscular once  insulin glargine Injectable (LANTUS) 16 Unit(s) SubCutaneous at bedtime  insulin lispro (ADMELOG) corrective regimen sliding scale   SubCutaneous three times a day before meals  insulin lispro (ADMELOG) corrective regimen sliding scale   SubCutaneous at bedtime  insulin lispro Injectable (ADMELOG) 6 Unit(s) SubCutaneous three times a day before meals  polyethylene glycol 3350 17 Gram(s) Oral daily  potassium phosphate / sodium phosphate Tablet (K-PHOS No. 2) 1 Tablet(s) Oral once  sodium chloride 0.9%. 1000 milliLiter(s) (60 mL/Hr) IV Continuous <Continuous>    MEDICATIONS  (PRN):  acetaminophen     Tablet .. 650 milliGRAM(s) Oral every 6 hours PRN Temp greater or equal to 38C (100.4F), Moderate Pain (4 - 6), Severe Pain (7 - 10)  dextrose Oral Gel 15 Gram(s) Oral once PRN Blood Glucose LESS THAN 70 milliGRAM(s)/deciliter  guaiFENesin Oral Liquid (Sugar-Free) 200 milliGRAM(s) Oral every 6 hours PRN Cough  senna 2 Tablet(s) Oral at bedtime PRN Constipation      CAPILLARY BLOOD GLUCOSE      POCT Blood Glucose.: 343 mg/dL (08 Sep 2022 09:34)  POCT Blood Glucose.: 254 mg/dL (08 Sep 2022 08:28)  POCT Blood Glucose.: 214 mg/dL (07 Sep 2022 21:53)  POCT Blood Glucose.: 295 mg/dL (07 Sep 2022 17:27)  POCT Blood Glucose.: 318 mg/dL (07 Sep 2022 12:41)    I&O's Summary      PHYSICAL EXAM:  Vital Signs Last 24 Hrs  T(C): 36.9 (08 Sep 2022 06:43), Max: 37.6 (07 Sep 2022 21:43)  T(F): 98.4 (08 Sep 2022 06:43), Max: 99.6 (07 Sep 2022 21:43)  HR: 95 (08 Sep 2022 06:43) (92 - 105)  BP: 159/84 (08 Sep 2022 06:43) (130/72 - 170/81)  BP(mean): --  RR: 16 (08 Sep 2022 06:43) (16 - 17)  SpO2: 98% (08 Sep 2022 06:43) (96% - 99%)    Parameters below as of 08 Sep 2022 06:43  Patient On (Oxygen Delivery Method): room air        CONSTITUTIONAL: NAD, well-developed, well-groomed  EYES: EOMI; conjunctiva and sclera clear  ENMT: Moist oral mucosa  RESPIRATORY: Normal respiratory effort; lungs are clear to auscultation bilaterally  CARDIOVASCULAR: Regular rate and rhythm, normal S1 and S2, no murmur; No lower extremity edema  ABDOMEN: Nontender to palpation, normoactive bowel sounds, no rebound/guarding  MUSCULOSKELETAL:   no clubbing or cyanosis of digits; no joint swelling or tenderness to palpation  PSYCH: A+O to person, place, and time; affect appropriate  NEUROLOGY: CN 2-12 are intact and symmetric; no gross sensory deficits   SKIN: No rashes; no palpable lesions    LABS:                        11.0   6.56  )-----------( 442      ( 08 Sep 2022 06:15 )             33.0     09-08    137  |  101  |  4<L>  ----------------------------<  251<H>  4.8   |  27  |  0.33<L>    Ca    9.1      08 Sep 2022 06:15  Phos  2.4     -  Mg     1.70     -    TPro  6.6  /  Alb  2.8<L>  /  TBili  0.5  /  DBili  x   /  AST  22  /  ALT  14  /  AlkPhos  64  -          Urinalysis Basic - ( 06 Sep 2022 13:37 )    Color: Yellow / Appearance: Clear / S.044 / pH: x  Gluc: x / Ketone: Large  / Bili: Negative / Urobili: <2 mg/dL   Blood: x / Protein: 30 mg/dL / Nitrite: Negative   Leuk Esterase: Negative / RBC: 5-10 /HPF / WBC 5 /HPF   Sq Epi: x / Non Sq Epi: Occasional / Bacteria: Negative        Culture - Sputum (collected 06 Sep 2022 20:54)  Source: .Sputum Sputum  Gram Stain (07 Sep 2022 04:45):    Few polymorphonuclear leukocytes per low power field    Rare Squamous epithelial cells per low power field    Few Gram positive cocci in pairs seen per oil power field    Few Gram Variable Rods seen per oil power field  Preliminary Report (07 Sep 2022 19:05):    Normal Respiratory Kira present    Culture - Blood (collected 05 Sep 2022 22:30)  Source: .Blood Blood  Preliminary Report (07 Sep 2022 04:02):    No growth to date.    Culture - Blood (collected 05 Sep 2022 22:15)  Source: .Blood Blood-Peripheral  Preliminary Report (07 Sep 2022 04:02):    No growth to date.      RADIOLOGY & ADDITIONAL TESTS:  Results Reviewed:   Imaging Personally Reviewed:  Electrocardiogram Personally Reviewed:    COORDINATION OF CARE:  Care Discussed with Consultants/Other Providers [Y/N]: Y  Prior or Outpatient Records Reviewed [Y/N]: Y   [facility] of Hospital Medicine  Mariama Wheat MD  Pager (M-F, 8A-5P): [phone]  Other Times:      Patient is a 72y old  Female who presents with a chief complaint of Cough and Shortness of breath (08 Sep 2022 09:16)      SUBJECTIVE / OVERNIGHT EVENTS:  pt feels breathing has improved - still coughing at lot.  She is still reluctant about insulin.      MEDICATIONS  (STANDING):  atorvastatin 40 milliGRAM(s) Oral at bedtime  cefTRIAXone   IVPB 1000 milliGRAM(s) IV Intermittent every 24 hours  chlorhexidine 2% Cloths 1 Application(s) Topical daily  clopidogrel Tablet 75 milliGRAM(s) Oral daily  dextrose 5%. 1000 milliLiter(s) (100 mL/Hr) IV Continuous <Continuous>  dextrose 5%. 1000 milliLiter(s) (50 mL/Hr) IV Continuous <Continuous>  dextrose 50% Injectable 25 Gram(s) IV Push once  dextrose 50% Injectable 12.5 Gram(s) IV Push once  dextrose 50% Injectable 25 Gram(s) IV Push once  enalapril 10 milliGRAM(s) Oral daily  enoxaparin Injectable 40 milliGRAM(s) SubCutaneous every 24 hours  influenza  Vaccine (HIGH DOSE) 0.7 milliLiter(s) IntraMuscular once  insulin glargine Injectable (LANTUS) 16 Unit(s) SubCutaneous at bedtime  insulin lispro (ADMELOG) corrective regimen sliding scale   SubCutaneous three times a day before meals  insulin lispro (ADMELOG) corrective regimen sliding scale   SubCutaneous at bedtime  insulin lispro Injectable (ADMELOG) 6 Unit(s) SubCutaneous three times a day before meals  polyethylene glycol 3350 17 Gram(s) Oral daily  potassium phosphate / sodium phosphate Tablet (K-PHOS No. 2) 1 Tablet(s) Oral once  sodium chloride 0.9%. 1000 milliLiter(s) (60 mL/Hr) IV Continuous <Continuous>    MEDICATIONS  (PRN):  acetaminophen     Tablet .. 650 milliGRAM(s) Oral every 6 hours PRN Temp greater or equal to 38C (100.4F), Moderate Pain (4 - 6), Severe Pain (7 - 10)  dextrose Oral Gel 15 Gram(s) Oral once PRN Blood Glucose LESS THAN 70 milliGRAM(s)/deciliter  guaiFENesin Oral Liquid (Sugar-Free) 200 milliGRAM(s) Oral every 6 hours PRN Cough  senna 2 Tablet(s) Oral at bedtime PRN Constipation      CAPILLARY BLOOD GLUCOSE      POCT Blood Glucose.: 343 mg/dL (08 Sep 2022 09:34)  POCT Blood Glucose.: 254 mg/dL (08 Sep 2022 08:28)  POCT Blood Glucose.: 214 mg/dL (07 Sep 2022 21:53)  POCT Blood Glucose.: 295 mg/dL (07 Sep 2022 17:27)  POCT Blood Glucose.: 318 mg/dL (07 Sep 2022 12:41)    I&O's Summary      PHYSICAL EXAM:  Vital Signs Last 24 Hrs  T(C): 36.9 (08 Sep 2022 06:43), Max: 37.6 (07 Sep 2022 21:43)  T(F): 98.4 (08 Sep 2022 06:43), Max: 99.6 (07 Sep 2022 21:43)  HR: 95 (08 Sep 2022 06:43) (92 - 105)  BP: 159/84 (08 Sep 2022 06:43) (130/72 - 170/81)  BP(mean): --  RR: 16 (08 Sep 2022 06:43) (16 - 17)  SpO2: 98% (08 Sep 2022 06:43) (96% - 99%)    Parameters below as of 08 Sep 2022 06:43  Patient On (Oxygen Delivery Method): room air        CONSTITUTIONAL: NAD, well-developed, well-groomed  EYES: EOMI; conjunctiva and sclera clear  ENMT: Moist oral mucosa  RESPIRATORY: coarse breath sounds  CARDIOVASCULAR: Regular rate and rhythm, normal S1 and S2, no murmur; No lower extremity edema  ABDOMEN: Nontender to palpation, normoactive bowel sounds, no rebound/guarding  MUSCULOSKELETAL:   no clubbing or cyanosis of digits; no joint swelling or tenderness to palpation  PSYCH: A+O to person, place, and time; affect appropriate  NEUROLOGY: CN 2-12 are intact and symmetric; no gross sensory deficits   SKIN: No rashes; no palpable lesions    LABS:                        11.0   6.56  )-----------( 442      ( 08 Sep 2022 06:15 )             33.0     09-08    137  |  101  |  4<L>  ----------------------------<  251<H>  4.8   |  27  |  0.33<L>    Ca    9.1      08 Sep 2022 06:15  Phos  2.4     -  Mg     1.70         TPro  6.6  /  Alb  2.8<L>  /  TBili  0.5  /  DBili  x   /  AST  22  /  ALT  14  /  AlkPhos  64  -          Urinalysis Basic - ( 06 Sep 2022 13:37 )    Color: Yellow / Appearance: Clear / S.044 / pH: x  Gluc: x / Ketone: Large  / Bili: Negative / Urobili: <2 mg/dL   Blood: x / Protein: 30 mg/dL / Nitrite: Negative   Leuk Esterase: Negative / RBC: 5-10 /HPF / WBC 5 /HPF   Sq Epi: x / Non Sq Epi: Occasional / Bacteria: Negative        Culture - Sputum (collected 06 Sep 2022 20:54)  Source: .Sputum Sputum  Gram Stain (07 Sep 2022 04:45):    Few polymorphonuclear leukocytes per low power field    Rare Squamous epithelial cells per low power field    Few Gram positive cocci in pairs seen per oil power field    Few Gram Variable Rods seen per oil power field  Preliminary Report (07 Sep 2022 19:05):    Normal Respiratory Kira present    Culture - Blood (collected 05 Sep 2022 22:30)  Source: .Blood Blood  Preliminary Report (07 Sep 2022 04:02):    No growth to date.    Culture - Blood (collected 05 Sep 2022 22:15)  Source: .Blood Blood-Peripheral  Preliminary Report (07 Sep 2022 04:02):    No growth to date.      RADIOLOGY & ADDITIONAL TESTS:  Results Reviewed:   Imaging Personally Reviewed:  Electrocardiogram Personally Reviewed:    COORDINATION OF CARE:  Care Discussed with Consultants/Other Providers [Y/N]: Y  Prior or Outpatient Records Reviewed [Y/N]: Y

## 2022-09-08 NOTE — DISCHARGE NOTE NURSING/CASE MANAGEMENT/SOCIAL WORK - PATIENT PORTAL LINK FT
You can access the FollowMyHealth Patient Portal offered by [facility] by registering at the following website: [email or web address]. By joining [employer]’s FollowMyHealth portal, you will also be able to view your health information using other applications (apps) compatible with our system.

## 2022-09-08 NOTE — PROGRESS NOTE ADULT - ASSESSMENT
73 yo F with PMH of HTN, HLD, uncontrolled DM2 presenting with cough, found to have pneumonia. Endocrine consulted for DM2 management.    1. Type 2 Diabetes Mellitus, uncontrolled  HbA1c  11.9%  Home Regimen: Metformin 1000 mg PO BID, Januvia 100 mg PO daily  cpeptide checked, resulted borderline low - however patient having hyperglycemia. Recommend to repeat    While inpatient:  BG target 100-180 mg/dl  Continue Lantus 16 units SQ qHS  Continue Admelog 6 units SQ TID before meals (Hold if NPO/not eating meal)   Continue Admelog LOW dose correctional scale before meals, low dose at bedtime   Check BG before meals and bedtime  Consistent carbohydrate diet, RD consult  DM Education - Daughter in law will administer insulin and check FS. Is RN, knows how to use insulin PEN and glucometer.    Discharge Plan:  Lantus Solostar PEN (dose TBD), + Metformin 1000 mg PO BID + Januvia 100 mg PO daily   Please prescribe new glucometer, glucose test strips, lancets, alcohol swabs and insulin PEN needles  [employer] checked for coverage - denied by Medicaid as patient is not on 3 insulin injections/day or greater. Patient's family is ok with fingersticks for now.  Patient should make diet and lifestyle changes (moderate carbs, exercise) and followup as outpatient for further titration and monitoring  Followup with PCP, endocrinology, opthalmology and podiatry as outpatient  For endocrine followup - Medicine Specialties at [address] [address], [phone]    2. Hypertension  BP goal <130/80  Above goal on Enalapril  Titration as per primary team    3. Hyperlipidemia  LDL 36, within goal of less than 70  Continue Atorvastatin 40 mg daily if no contraindications     Rita Singleton  Nurse Practitioner  Division of Endocrinology & Diabetes  In house pager #[phone]/long range pager #[phone]    If before 9AM or after 6PM, or on weekends/holidays, please call endocrine answering service for assistance ([phone]).  For nonurgent matters email [email or web address] for assistance.  73 yo F with PMH of HTN, HLD, uncontrolled DM2 presenting with cough, found to have pneumonia. Endocrine consulted for DM2 management.    1. Type 2 Diabetes Mellitus, uncontrolled  HbA1c  11.9%  Home Regimen: Metformin 1000 mg PO BID, Januvia 100 mg PO daily  cpeptide checked, resulted borderline low - however patient having hyperglycemia. Recommend to repeat tomorrow AM    While inpatient:  BG target 100-180 mg/dl  Continue Lantus 16 units SQ qHS  Continue Admelog 6 units SQ TID before meals (Hold if NPO/not eating meal)   Continue Admelog LOW dose correctional scale before meals, low dose at bedtime   Check BG before meals and bedtime  Consistent carbohydrate diet, RD consult  DM Education - Daughter in law will administer insulin and check FS. Is RN, knows how to use insulin PEN and glucometer.    Discharge Plan:  Lantus Solostar PEN (dose TBD), + Metformin 1000 mg PO BID + Januvia 100 mg PO daily   Please prescribe new glucometer, glucose test strips, lancets, alcohol swabs and insulin PEN needles  [employer] checked for coverage - denied by Medicaid as patient is not on 3 insulin injections/day or greater. Patient's family is ok with fingersticks for now.  Patient should make diet and lifestyle changes (moderate carbs, exercise) and followup as outpatient for further titration and monitoring  Followup with PCP, endocrinology, opthalmology and podiatry as outpatient  For endocrine followup - [facility] Specialties at [place], [address] [place] [id number], [phone]  *Emailed office to schedule - will update note with appt when able    2. Hypertension  BP goal <130/80  Above goal on Enalapril  Titration as per primary team    3. Hyperlipidemia  LDL 36, within goal of less than 70  Continue Atorvastatin 40 mg daily if no contraindications     Rita Singleton  Nurse Practitioner  Division of Endocrinology & Diabetes  In house pager #[phone]/long range pager #[phone]    If before 9AM or after 6PM, or on weekends/holidays, please call endocrine answering service for assistance ([phone]).  For nonurgent matters email [email or web address] for assistance.

## 2022-09-08 NOTE — PROGRESS NOTE ADULT - SUBJECTIVE AND OBJECTIVE BOX
Chief Complaint: DM 2    History: Patient seen at bedside. Hyperglycemia this AM (expected) due to transitioning Lantus from AM to bedtime. Resolving at lunch to 226 mg/dl. Patient reports she is eating meals, denies n/v, denies s/s of hypoglycemia    Spoke with patient's daughter in law (who is an RN) at [phone], discussed diabetes plan. Daughter in law will be administering insulin at home at bedtime. Knows how to use insulin PEN and glucometer. Reviewed that glucometery should be done minimally 2 times per day (AM and prior to insulin injection at bedtime) - daughter in law reports she will take responsibility for this. Reviewed that Freestyle Satnam was denied by insurance, daughter in law reports patient would likely not agree to it anyway. Requests that medical team continue to reinforce education about diabetes, patient is hesitant for insulin, hesitant to take Metformin and has misconceptions regarding dietary modifications for diabetes     Reviewed above education with patient at bedside today.   Reviewed importance of getting basal insulin every day at bedtime, and taking Metformin + Januvia. Patient reports she is concerned that Metformin pill is "too big" and her doctor told her to take "the smaller one" (presumably Januvia). Reviewed with her that BOTH medications must be taken  Patient agrees to this, but "only for a short time, I won't take insulin forever"   Reviewed dietary medications for diabetes - patient having difficulty understanding balancing/moderating carbohydrates, and increasing lean protein and vegetables. Reports "my doctor told me to never have Japanese potatoes or oranges, give me a list of things I can have"    MEDICATIONS  (STANDING):  atorvastatin 40 milliGRAM(s) Oral at bedtime  cefTRIAXone   IVPB 1000 milliGRAM(s) IV Intermittent every 24 hours  chlorhexidine 2% Cloths 1 Application(s) Topical daily  clopidogrel Tablet 75 milliGRAM(s) Oral daily  dextrose 5%. 1000 milliLiter(s) (100 mL/Hr) IV Continuous <Continuous>  dextrose 5%. 1000 milliLiter(s) (50 mL/Hr) IV Continuous <Continuous>  dextrose 50% Injectable 25 Gram(s) IV Push once  dextrose 50% Injectable 12.5 Gram(s) IV Push once  dextrose 50% Injectable 25 Gram(s) IV Push once  enalapril 10 milliGRAM(s) Oral daily  enoxaparin Injectable 40 milliGRAM(s) SubCutaneous every 24 hours  influenza  Vaccine (HIGH DOSE) 0.7 milliLiter(s) IntraMuscular once  insulin glargine Injectable (LANTUS) 16 Unit(s) SubCutaneous at bedtime  insulin lispro (ADMELOG) corrective regimen sliding scale   SubCutaneous three times a day before meals  insulin lispro (ADMELOG) corrective regimen sliding scale   SubCutaneous at bedtime  insulin lispro Injectable (ADMELOG) 6 Unit(s) SubCutaneous three times a day before meals  polyethylene glycol 3350 17 Gram(s) Oral daily  sodium chloride 0.9%. 1000 milliLiter(s) (60 mL/Hr) IV Continuous <Continuous>    MEDICATIONS  (PRN):  acetaminophen     Tablet .. 650 milliGRAM(s) Oral every 6 hours PRN Temp greater or equal to 38C (100.4F), Moderate Pain (4 - 6), Severe Pain (7 - 10)  dextrose Oral Gel 15 Gram(s) Oral once PRN Blood Glucose LESS THAN 70 milliGRAM(s)/deciliter  guaiFENesin Oral Liquid (Sugar-Free) 200 milliGRAM(s) Oral every 6 hours PRN Cough  senna 2 Tablet(s) Oral at bedtime PRN Constipation    No Known Allergies    Review of Systems:  Cardiovascular: No chest pain  Respiratory: No SOB  GI: No nausea, vomiting  Endocrine: no hypoglycemia     PHYSICAL EXAM:  VITALS: T(C): 36.6 (09-08-22 @ 10:30)  T(F): 97.8 (09-08-22 @ 10:30), Max: 99.6 (09-07-22 @ 21:43)  HR: 99 (09-08-22 @ 10:30) (95 - 105)  BP: 139/84 (09-08-22 @ 10:30) (130/72 - 165/95)  RR:  (16 - 18)  SpO2:  (96% - 98%)  Wt(kg): --  GENERAL: NAD  EYES: No proptosis, no lid lag, anicteric  HEENT:  Atraumatic, Normocephalic, moist mucous membranes  RESPIRATORY: unlabored respirations     CAPILLARY BLOOD GLUCOSE    POCT Blood Glucose.: 226 mg/dL (08 Sep 2022 12:20)  POCT Blood Glucose.: 343 mg/dL (08 Sep 2022 09:34)  POCT Blood Glucose.: 254 mg/dL (08 Sep 2022 08:28)  POCT Blood Glucose.: 214 mg/dL (07 Sep 2022 21:53)  POCT Blood Glucose.: 295 mg/dL (07 Sep 2022 17:27)      09-08    137  |  101  |  4<L>  ----------------------------<  251<H>  4.8   |  27  |  0.33<L>    eGFR: 109    Ca    9.1      09-08  Mg     1.70     09-08  Phos  2.4     09-08    TPro  6.6  /  Alb  2.8<L>  /  TBili  0.5  /  DBili  x   /  AST  22  /  ALT  14  /  AlkPhos  64  09-07      Thyroid Function Tests:  09-05 @ 21:45 TSH 0.98 FreeT4 -- T3 -- Anti TPO -- Anti Thyroglobulin Ab -- TSI --      A1C with Estimated Average Glucose Result: 11.9 % (09-06-22 @ 10:10)    Diet, Consistent Carbohydrate/No Snacks:   Low Sodium  [employer] Vegetarian (Accepts Fish) (09-06-22 @ 14:06)

## 2022-09-08 NOTE — DISCHARGE NOTE PROVIDER - NSDCMRMEDTOKEN_GEN_ALL_CORE_FT
atorvastatin 40 mg oral tablet: 1 tab(s) orally once a day  enalapril 10 mg oral tablet: 1 tab(s) orally once a day  Freestyle Satnam 2 sensors: Apply 1 sensor every 14 days  Freestyle Precision Guillermo Strips: Test blood sugar four times a day when you see check blood glucose symbol or when symptoms don&#x27;t match Satnam reading.  Januvia 100 mg oral tablet: 1 tab(s) orally once a day  metFORMIN 1000 mg oral tablet: 1 tab(s) orally 2 times a day  Plavix 75 mg oral tablet: 1 tab(s) orally once a day   Alcohol Pads : Check BG 3x daily   amoxicillin-clavulanate 875 mg-125 mg oral tablet: 1 tab(s) orally 2 times a day   atorvastatin 40 mg oral tablet: 1 tab(s) orally once a day  enalapril 10 mg oral tablet: 1 tab(s) orally once a day  Glucometer: Dispense 1 device   guaiFENesin 100 mg/5 mL oral liquid: 10 milliliter(s) orally every 6 hours, As needed, Cough  Insulin pen needles : Use with insulin pen daily   Januvia 100 mg oral tablet: 1 tab(s) orally once a day  Lancets : Check BG 3x daily   Lantus Solostar Pen 100 units/mL subcutaneous solution: 16 unit(s) subcutaneous once a day (at bedtime)  metFORMIN 1000 mg oral tablet: 1 tab(s) orally 2 times a day  Plavix 75 mg oral tablet: 1 tab(s) orally once a day  Test Stripis: Check BG 3x daily    Alcohol Pads : Check BG 3x daily   amoxicillin-clavulanate 875 mg-125 mg oral tablet: 1 tab(s) orally 2 times a day   atorvastatin 40 mg oral tablet: 1 tab(s) orally once a day  enalapril 10 mg oral tablet: 1 tab(s) orally once a day  Glucometer: Dispense 1 device   guaiFENesin 100 mg/5 mL oral liquid: 10 milliliter(s) orally every 6 hours, As needed, Cough  Insulin pen needles : Use with insulin pen daily   Januvia 100 mg oral tablet: 1 tab(s) orally once a day  Lancets : Check BG 3x daily   Lantus Solostar Pen 100 units/mL subcutaneous solution: 18 unit(s) subcutaneous once a day (at bedtime)  metFORMIN 1000 mg oral tablet: 1 tab(s) orally 2 times a day  Plavix 75 mg oral tablet: 1 tab(s) orally once a day  Test Stripis: Check BG 3x daily

## 2022-09-08 NOTE — DISCHARGE NOTE PROVIDER - NSDCCPCAREPLAN_GEN_ALL_CORE_FT
PRINCIPAL DISCHARGE DIAGNOSIS  Diagnosis: Multifocal pneumonia  Assessment and Plan of Treatment:       SECONDARY DISCHARGE DIAGNOSES  Diagnosis: Hypertension  Assessment and Plan of Treatment:     Diagnosis: Hyperlipidemia  Assessment and Plan of Treatment:     Diagnosis: Diabetes mellitus  Assessment and Plan of Treatment:      PRINCIPAL DISCHARGE DIAGNOSIS  Diagnosis: Multifocal pneumonia  Assessment and Plan of Treatment: Complete antibiotic therapy as directed.  Monitor for any further signs and symptoms of further infection, including but not limited to, fevers/chills, shortness of breath, increased heart rate, dizziness, or abrupt changes in mental status.        SECONDARY DISCHARGE DIAGNOSES  Diagnosis: Left cataract  Assessment and Plan of Treatment: Follow up outpatient ophthalmologist for further management.    Diagnosis: Hypertension  Assessment and Plan of Treatment: Continue blood pressure medication regimen as directed. Monitor for any visual changes, headaches or dizziness.  Monitor blood pressure regularly.  Follow up with your PCP for further management for high blood pressure.      Diagnosis: Hyperlipidemia  Assessment and Plan of Treatment: Continue cholesterol control medications. Continue DASH diet. Follow up with your PCP within 1 week of discharge for further management and monitoring of lipid and cholesterol panels.      Diagnosis: Diabetes mellitus  Assessment and Plan of Treatment: You were found with uncontrolled diabetes *** A1c 11.6 ****. You were seen by Endocrinology in-hospital. Continue consistent carbohydrate diet.  Monitor blood glucose levels throughout the day before meals and at bedtime.  Record blood sugars and bring to outpatient providers appointment in order to be reviewed by your doctor for management modifications.  Be aware of diabetes management symptoms including feeling cool and clammy may be related to low glucose levels.  Feeling hot and dry may indicate high glucose levels. If you feel these symptoms, check your blood sugar.  Make regular podiatry appointments in order to have feet checked for wounds and toe nails cut by a doctor to prevent infections, as well as, appointments with an ophthalmologist to monitor your vision. If needed, you may follow up with Endocrinology at [facility] [place], [address] [facility] [address]. Call [phone] to make an appointment.     PRINCIPAL DISCHARGE DIAGNOSIS  Diagnosis: Multifocal pneumonia  Assessment and Plan of Treatment: Complete antibiotic therapy as directed.  Monitor for any further signs and symptoms of further infection, including but not limited to, fevers/chills, shortness of breath, increased heart rate, dizziness, or abrupt changes in mental status.        SECONDARY DISCHARGE DIAGNOSES  Diagnosis: Left cataract  Assessment and Plan of Treatment: Follow up outpatient ophthalmologist for further management.    Diagnosis: Hypertension  Assessment and Plan of Treatment: Continue blood pressure medication regimen as directed. Monitor for any visual changes, headaches or dizziness.  Monitor blood pressure regularly.  Follow up with your PCP for further management for high blood pressure.      Diagnosis: Hyperlipidemia  Assessment and Plan of Treatment: Continue cholesterol control medications. Continue DASH diet. Follow up with your PCP within 1 week of discharge for further management and monitoring of lipid and cholesterol panels.      Diagnosis: External hemorrhoid  Assessment and Plan of Treatment: Follow up outpatient PCP for referral to gastroenterologist for further management.    Diagnosis: Diabetes mellitus  Assessment and Plan of Treatment: You were found with uncontrolled diabetes *** A1c 11.6 ****. You were seen by Endocrinology in-hospital. Continue consistent carbohydrate diet.  Monitor blood glucose levels throughout the day before meals and at bedtime.  Record blood sugars and bring to outpatient providers appointment in order to be reviewed by your doctor for management modifications.  Be aware of diabetes management symptoms including feeling cool and clammy may be related to low glucose levels.  Feeling hot and dry may indicate high glucose levels. If you feel these symptoms, check your blood sugar.  Make regular podiatry appointments in order to have feet checked for wounds and toe nails cut by a doctor to prevent infections, as well as, appointments with an ophthalmologist to monitor your vision. If needed, you may follow up with Endocrinology at [facility] [place], [address] [facility] [address]. Call [phone] to make an appointment.     PRINCIPAL DISCHARGE DIAGNOSIS  Diagnosis: Multifocal pneumonia  Assessment and Plan of Treatment: Complete antibiotic therapy as directed.  Monitor for any further signs and symptoms of further infection, including but not limited to, fevers/chills, shortness of breath, increased heart rate, dizziness, or abrupt changes in mental status.        SECONDARY DISCHARGE DIAGNOSES  Diagnosis: Left cataract  Assessment and Plan of Treatment: Follow up outpatient ophthalmologist for further management.    Diagnosis: Hypertension  Assessment and Plan of Treatment: Continue blood pressure medication regimen as directed. Monitor for any visual changes, headaches or dizziness.  Monitor blood pressure regularly.  Follow up with your PCP for further management for high blood pressure.      Diagnosis: Hyperlipidemia  Assessment and Plan of Treatment: Continue cholesterol control medications. Continue DASH diet. Follow up with your PCP within 1 week of discharge for further management and monitoring of lipid and cholesterol panels.      Diagnosis: External hemorrhoid  Assessment and Plan of Treatment: Follow up outpatient PCP for referral to gastroenterologist for further management.    Diagnosis: Diabetes mellitus  Assessment and Plan of Treatment: You were found with uncontrolled diabetes *** A1c 11.9 ****. You were seen by Endocrinology in-hospital. Continue consistent carbohydrate diet.  Monitor blood glucose levels throughout the day before meals and at bedtime.  Record blood sugars and bring to outpatient providers appointment in order to be reviewed by your doctor for management modifications.  Be aware of diabetes management symptoms including feeling cool and clammy may be related to low glucose levels.  Feeling hot and dry may indicate high glucose levels. If you feel these symptoms, check your blood sugar.  Make regular podiatry appointments in order to have feet checked for wounds and toe nails cut by a doctor to prevent infections, as well as, appointments with an ophthalmologist to monitor your vision. If needed, you may follow up with Endocrinology at [facility] [place], [address] [facility] [address]. Call [phone] to make an appointment.

## 2022-09-08 NOTE — PHARMACOTHERAPY INTERVENTION NOTE - COMMENTS
Freestyle Satnam 2 sensor denied at pharmacy. Called medicaid for prior authorization - also denied since patient will not be on 3x/day injections outpatient.

## 2022-09-08 NOTE — DISCHARGE NOTE PROVIDER - CARE PROVIDER_API CALL
Endocrinology Clinic,   [address] [facility] [address]  Phone: [phone]  Fax: (   )    -  Follow Up Time:

## 2022-09-09 VITALS — WEIGHT: 147.27 LBS

## 2022-09-09 LAB
ANION GAP SERPL CALC-SCNC: 8 MMOL/L — SIGNIFICANT CHANGE UP (ref 7–14)
BUN SERPL-MCNC: 3 MG/DL — LOW (ref 7–23)
C PEPTIDE SERPL-MCNC: 0.8 NG/ML — LOW (ref 1.1–4.4)
CALCIUM SERPL-MCNC: 9.7 MG/DL — SIGNIFICANT CHANGE UP (ref 8.4–10.5)
CHLORIDE SERPL-SCNC: 99 MMOL/L — SIGNIFICANT CHANGE UP (ref 98–107)
CO2 SERPL-SCNC: 31 MMOL/L — SIGNIFICANT CHANGE UP (ref 22–31)
CREAT SERPL-MCNC: 0.36 MG/DL — LOW (ref 0.5–1.3)
EGFR: 107 ML/MIN/1.73M2 — SIGNIFICANT CHANGE UP
GLUCOSE BLDC GLUCOMTR-MCNC: 215 MG/DL — HIGH (ref 70–99)
GLUCOSE BLDC GLUCOMTR-MCNC: 319 MG/DL — HIGH (ref 70–99)
GLUCOSE SERPL-MCNC: 228 MG/DL — HIGH (ref 70–99)
HCT VFR BLD CALC: 34.9 % — SIGNIFICANT CHANGE UP (ref 34.5–45)
HGB BLD-MCNC: 11.1 G/DL — LOW (ref 11.5–15.5)
MAGNESIUM SERPL-MCNC: 1.7 MG/DL — SIGNIFICANT CHANGE UP (ref 1.6–2.6)
MCHC RBC-ENTMCNC: 29.4 PG — SIGNIFICANT CHANGE UP (ref 27–34)
MCHC RBC-ENTMCNC: 31.8 GM/DL — LOW (ref 32–36)
MCV RBC AUTO: 92.3 FL — SIGNIFICANT CHANGE UP (ref 80–100)
NRBC # BLD: 0 /100 WBCS — SIGNIFICANT CHANGE UP (ref 0–0)
NRBC # FLD: 0 K/UL — SIGNIFICANT CHANGE UP (ref 0–0)
PHOSPHATE SERPL-MCNC: 4 MG/DL — SIGNIFICANT CHANGE UP (ref 2.5–4.5)
PLATELET # BLD AUTO: 462 K/UL — HIGH (ref 150–400)
POTASSIUM SERPL-MCNC: 5.2 MMOL/L — SIGNIFICANT CHANGE UP (ref 3.5–5.3)
POTASSIUM SERPL-SCNC: 5.2 MMOL/L — SIGNIFICANT CHANGE UP (ref 3.5–5.3)
RBC # BLD: 3.78 M/UL — LOW (ref 3.8–5.2)
RBC # FLD: 12.3 % — SIGNIFICANT CHANGE UP (ref 10.3–14.5)
SODIUM SERPL-SCNC: 138 MMOL/L — SIGNIFICANT CHANGE UP (ref 135–145)
WBC # BLD: 5.41 K/UL — SIGNIFICANT CHANGE UP (ref 3.8–10.5)
WBC # FLD AUTO: 5.41 K/UL — SIGNIFICANT CHANGE UP (ref 3.8–10.5)

## 2022-09-09 PROCEDURE — 99239 HOSP IP/OBS DSCHRG MGMT >30: CPT

## 2022-09-09 PROCEDURE — 99232 SBSQ HOSP IP/OBS MODERATE 35: CPT

## 2022-09-09 RX ORDER — INSULIN GLARGINE 100 [IU]/ML
18 INJECTION, SOLUTION SUBCUTANEOUS AT BEDTIME
Refills: 0 | Status: DISCONTINUED | OUTPATIENT
Start: 2022-09-09 | End: 2022-09-09

## 2022-09-09 RX ORDER — ENOXAPARIN SODIUM 100 MG/ML
18 INJECTION SUBCUTANEOUS
Qty: 0 | Refills: 0 | DISCHARGE

## 2022-09-09 RX ORDER — CLOPIDOGREL BISULFATE 75 MG/1
1 TABLET, FILM COATED ORAL
Qty: 14 | Refills: 0
Start: 2022-09-09

## 2022-09-09 RX ORDER — CLOPIDOGREL BISULFATE 75 MG/1
1 TABLET, FILM COATED ORAL
Qty: 0 | Refills: 0 | DISCHARGE

## 2022-09-09 RX ADMIN — CLOPIDOGREL BISULFATE 75 MILLIGRAM(S): 75 TABLET, FILM COATED ORAL at 12:48

## 2022-09-09 RX ADMIN — Medication 6 UNIT(S): at 12:47

## 2022-09-09 RX ADMIN — CHLORHEXIDINE GLUCONATE 1 APPLICATION(S): 213 SOLUTION TOPICAL at 12:46

## 2022-09-09 RX ADMIN — Medication 2: at 08:47

## 2022-09-09 RX ADMIN — Medication 4: at 12:46

## 2022-09-09 RX ADMIN — Medication 6 UNIT(S): at 08:47

## 2022-09-09 RX ADMIN — Medication 10 MILLIGRAM(S): at 05:35

## 2022-09-09 RX ADMIN — ENOXAPARIN SODIUM 40 MILLIGRAM(S): 100 INJECTION SUBCUTANEOUS at 05:35

## 2022-09-09 RX ADMIN — POLYETHYLENE GLYCOL 3350 17 GRAM(S): 17 POWDER, FOR SOLUTION ORAL at 12:47

## 2022-09-09 NOTE — PROGRESS NOTE ADULT - SUBJECTIVE AND OBJECTIVE BOX
[facility] Division of Hospital Medicine  Mariama Wheat MD  Pager (M-F, 8A-5P): [phone]  Other Times:  [id number]    Patient is a 72y old  Female who presents with a chief complaint of Cough and Shortness of breath (08 Sep 2022 13:47)      SUBJECTIVE / OVERNIGHT EVENTS:      MEDICATIONS  (STANDING):  atorvastatin 40 milliGRAM(s) Oral at bedtime  cefTRIAXone   IVPB 1000 milliGRAM(s) IV Intermittent every 24 hours  chlorhexidine 2% Cloths 1 Application(s) Topical daily  clopidogrel Tablet 75 milliGRAM(s) Oral daily  dextrose 5%. 1000 milliLiter(s) (100 mL/Hr) IV Continuous <Continuous>  dextrose 5%. 1000 milliLiter(s) (50 mL/Hr) IV Continuous <Continuous>  dextrose 50% Injectable 25 Gram(s) IV Push once  dextrose 50% Injectable 12.5 Gram(s) IV Push once  dextrose 50% Injectable 25 Gram(s) IV Push once  enalapril 10 milliGRAM(s) Oral daily  enoxaparin Injectable 40 milliGRAM(s) SubCutaneous every 24 hours  influenza  Vaccine (HIGH DOSE) 0.7 milliLiter(s) IntraMuscular once  insulin glargine Injectable (LANTUS) 18 Unit(s) SubCutaneous at bedtime  insulin lispro (ADMELOG) corrective regimen sliding scale   SubCutaneous three times a day before meals  insulin lispro (ADMELOG) corrective regimen sliding scale   SubCutaneous at bedtime  insulin lispro Injectable (ADMELOG) 6 Unit(s) SubCutaneous three times a day before meals  polyethylene glycol 3350 17 Gram(s) Oral daily  sodium chloride 0.9%. 1000 milliLiter(s) (60 mL/Hr) IV Continuous <Continuous>    MEDICATIONS  (PRN):  acetaminophen     Tablet .. 650 milliGRAM(s) Oral every 6 hours PRN Temp greater or equal to 38C (100.4F), Moderate Pain (4 - 6), Severe Pain (7 - 10)  dextrose Oral Gel 15 Gram(s) Oral once PRN Blood Glucose LESS THAN 70 milliGRAM(s)/deciliter  guaiFENesin Oral Liquid (Sugar-Free) 200 milliGRAM(s) Oral every 6 hours PRN Cough  senna 2 Tablet(s) Oral at bedtime PRN Constipation      CAPILLARY BLOOD GLUCOSE      POCT Blood Glucose.: 215 mg/dL (09 Sep 2022 08:34)  POCT Blood Glucose.: 218 mg/dL (08 Sep 2022 21:23)  POCT Blood Glucose.: 248 mg/dL (08 Sep 2022 17:29)  POCT Blood Glucose.: 226 mg/dL (08 Sep 2022 12:20)    I&O's Summary    08 Sep 2022 07:01  -  09 Sep 2022 07:00  --------------------------------------------------------  IN: 720 mL / OUT: 650 mL / NET: 70 mL    09 Sep 2022 07:01  -  09 Sep 2022 10:45  --------------------------------------------------------  IN: 480 mL / OUT: 300 mL / NET: 180 mL        PHYSICAL EXAM:  Vital Signs Last 24 Hrs  T(C): 36.9 (09 Sep 2022 05:40), Max: 37.4 (08 Sep 2022 21:44)  T(F): 98.5 (09 Sep 2022 05:40), Max: 99.3 (08 Sep 2022 21:44)  HR: 88 (09 Sep 2022 05:40) (88 - 100)  BP: 152/77 (09 Sep 2022 05:40) (138/77 - 152/77)  BP(mean): --  RR: 18 (09 Sep 2022 05:40) (17 - 18)  SpO2: 97% (09 Sep 2022 05:40) (97% - 100%)    Parameters below as of 09 Sep 2022 05:40  Patient On (Oxygen Delivery Method): room air        CONSTITUTIONAL: NAD, well-developed, well-groomed  EYES: EOMI; conjunctiva and sclera clear  ENMT: Moist oral mucosa  RESPIRATORY: Normal respiratory effort; lungs are clear to auscultation bilaterally  CARDIOVASCULAR: Regular rate and rhythm, normal S1 and S2, no murmur; No lower extremity edema  ABDOMEN: Nontender to palpation, normoactive bowel sounds, no rebound/guarding  MUSCULOSKELETAL:   no clubbing or cyanosis of digits; no joint swelling or tenderness to palpation  PSYCH: A+O to person, place, and time; affect appropriate  NEUROLOGY: CN 2-12 are intact and symmetric; no gross sensory deficits   SKIN: No rashes; no palpable lesions    LABS:                        11.1   5.41  )-----------( 462      ( 09 Sep 2022 06:40 )             34.9     09-09    138  |  99  |  3<L>  ----------------------------<  228<H>  5.2   |  31  |  0.36<L>    Ca    9.7      09 Sep 2022 06:40  Phos  4.0     09-09  Mg     1.70     09-09        Culture - Sputum (collected 06 Sep 2022 20:54)  Source: .Sputum Sputum  Gram Stain (07 Sep 2022 04:45):    Few polymorphonuclear leukocytes per low power field    Rare Squamous epithelial cells per low power field    Few Gram positive cocci in pairs seen per oil power field    Few Gram Variable Rods seen per oil power field  Final Report (08 Sep 2022 16:39):    Normal Respiratory Kira present      RADIOLOGY & ADDITIONAL TESTS:  Results Reviewed:   Imaging Personally Reviewed:  Electrocardiogram Personally Reviewed:    COORDINATION OF CARE:  Care Discussed with Consultants/Other Providers [Y/N]: Y  Prior or Outpatient Records Reviewed [Y/N]: Y   [facility] Division of Hospital Medicine  Mariama Wheat MD  Pager (M-F, 8A-5P): [phone]  Other Times:  [id number]    Patient is a 72y old  Female who presents with a chief complaint of Cough and Shortness of breath (08 Sep 2022 13:47)    SUBJECTIVE / OVERNIGHT EVENTS:  pt comfortable.      MEDICATIONS  (STANDING):  atorvastatin 40 milliGRAM(s) Oral at bedtime  cefTRIAXone   IVPB 1000 milliGRAM(s) IV Intermittent every 24 hours  chlorhexidine 2% Cloths 1 Application(s) Topical daily  clopidogrel Tablet 75 milliGRAM(s) Oral daily  dextrose 5%. 1000 milliLiter(s) (100 mL/Hr) IV Continuous <Continuous>  dextrose 5%. 1000 milliLiter(s) (50 mL/Hr) IV Continuous <Continuous>  dextrose 50% Injectable 25 Gram(s) IV Push once  dextrose 50% Injectable 12.5 Gram(s) IV Push once  dextrose 50% Injectable 25 Gram(s) IV Push once  enalapril 10 milliGRAM(s) Oral daily  enoxaparin Injectable 40 milliGRAM(s) SubCutaneous every 24 hours  influenza  Vaccine (HIGH DOSE) 0.7 milliLiter(s) IntraMuscular once  insulin glargine Injectable (LANTUS) 18 Unit(s) SubCutaneous at bedtime  insulin lispro (ADMELOG) corrective regimen sliding scale   SubCutaneous three times a day before meals  insulin lispro (ADMELOG) corrective regimen sliding scale   SubCutaneous at bedtime  insulin lispro Injectable (ADMELOG) 6 Unit(s) SubCutaneous three times a day before meals  polyethylene glycol 3350 17 Gram(s) Oral daily  sodium chloride 0.9%. 1000 milliLiter(s) (60 mL/Hr) IV Continuous <Continuous>    MEDICATIONS  (PRN):  acetaminophen     Tablet .. 650 milliGRAM(s) Oral every 6 hours PRN Temp greater or equal to 38C (100.4F), Moderate Pain (4 - 6), Severe Pain (7 - 10)  dextrose Oral Gel 15 Gram(s) Oral once PRN Blood Glucose LESS THAN 70 milliGRAM(s)/deciliter  guaiFENesin Oral Liquid (Sugar-Free) 200 milliGRAM(s) Oral every 6 hours PRN Cough  senna 2 Tablet(s) Oral at bedtime PRN Constipation      CAPILLARY BLOOD GLUCOSE      POCT Blood Glucose.: 215 mg/dL (09 Sep 2022 08:34)  POCT Blood Glucose.: 218 mg/dL (08 Sep 2022 21:23)  POCT Blood Glucose.: 248 mg/dL (08 Sep 2022 17:29)  POCT Blood Glucose.: 226 mg/dL (08 Sep 2022 12:20)    I&O's Summary    08 Sep 2022 07:01  -  09 Sep 2022 07:00  --------------------------------------------------------  IN: 720 mL / OUT: 650 mL / NET: 70 mL    09 Sep 2022 07:01  -  09 Sep 2022 10:45  --------------------------------------------------------  IN: 480 mL / OUT: 300 mL / NET: 180 mL        PHYSICAL EXAM:  Vital Signs Last 24 Hrs  T(C): 36.9 (09 Sep 2022 05:40), Max: 37.4 (08 Sep 2022 21:44)  T(F): 98.5 (09 Sep 2022 05:40), Max: 99.3 (08 Sep 2022 21:44)  HR: 88 (09 Sep 2022 05:40) (88 - 100)  BP: 152/77 (09 Sep 2022 05:40) (138/77 - 152/77)  BP(mean): --  RR: 18 (09 Sep 2022 05:40) (17 - 18)  SpO2: 97% (09 Sep 2022 05:40) (97% - 100%)    Parameters below as of 09 Sep 2022 05:40  Patient On (Oxygen Delivery Method): room air        CONSTITUTIONAL: NAD, well-developed, well-groomed  EYES: EOMI; conjunctiva and sclera clear  ENMT: Moist oral mucosa  RESPIRATORY: Normal respiratory effort; lungs are clear to auscultation bilaterally  CARDIOVASCULAR: Regular rate and rhythm, normal S1 and S2, no murmur; No lower extremity edema  ABDOMEN: Nontender to palpation, normoactive bowel sounds, no rebound/guarding  MUSCULOSKELETAL:   no clubbing or cyanosis of digits; no joint swelling or tenderness to palpation  PSYCH: A+O to person, place, and time; affect appropriate  NEUROLOGY: CN 2-12 are intact and symmetric; no gross sensory deficits   SKIN: No rashes; no palpable lesions    LABS:                        11.1   5.41  )-----------( 462      ( 09 Sep 2022 06:40 )             34.9     09-09    138  |  99  |  3<L>  ----------------------------<  228<H>  5.2   |  31  |  0.36<L>    Ca    9.7      09 Sep 2022 06:40  Phos  4.0     09-09  Mg     1.70     09-09        Culture - Sputum (collected 06 Sep 2022 20:54)  Source: .Sputum Sputum  Gram Stain (07 Sep 2022 04:45):    Few polymorphonuclear leukocytes per low power field    Rare Squamous epithelial cells per low power field    Few Gram positive cocci in pairs seen per oil power field    Few Gram Variable Rods seen per oil power field  Final Report (08 Sep 2022 16:39):    Normal Respiratory Kira present      RADIOLOGY & ADDITIONAL TESTS:  Results Reviewed:   Imaging Personally Reviewed:  Electrocardiogram Personally Reviewed:    COORDINATION OF CARE:  Care Discussed with Consultants/Other Providers [Y/N]: Y  Prior or Outpatient Records Reviewed [Y/N]: Y

## 2022-09-09 NOTE — PROGRESS NOTE ADULT - PROBLEM SELECTOR PLAN 8
- BP mildly elevated  - home enalapril 10mg daily

## 2022-09-09 NOTE — DIETITIAN INITIAL EVALUATION ADULT - ADD RECOMMEND
1. Monitor weights, labs, BM's, skin integrity, p.o. intake.  2. Please document % PO intake in nursing flowsheet.   3. Please Encourage po intake, assist with meals and menu selections, provide alternatives PRN.   4. Honor food and beverage preferences within diet restriction of patient in an effort to maximize level of nutrient intake.

## 2022-09-09 NOTE — DIETITIAN INITIAL EVALUATION ADULT - PERTINENT MEDS FT
MEDICATIONS  (STANDING):  atorvastatin 40 milliGRAM(s) Oral at bedtime  cefTRIAXone   IVPB 1000 milliGRAM(s) IV Intermittent every 24 hours  chlorhexidine 2% Cloths 1 Application(s) Topical daily  clopidogrel Tablet 75 milliGRAM(s) Oral daily  dextrose 5%. 1000 milliLiter(s) (100 mL/Hr) IV Continuous <Continuous>  dextrose 5%. 1000 milliLiter(s) (50 mL/Hr) IV Continuous <Continuous>  dextrose 50% Injectable 25 Gram(s) IV Push once  dextrose 50% Injectable 12.5 Gram(s) IV Push once  dextrose 50% Injectable 25 Gram(s) IV Push once  enalapril 10 milliGRAM(s) Oral daily  enoxaparin Injectable 40 milliGRAM(s) SubCutaneous every 24 hours  influenza  Vaccine (HIGH DOSE) 0.7 milliLiter(s) IntraMuscular once  insulin glargine Injectable (LANTUS) 18 Unit(s) SubCutaneous at bedtime  insulin lispro (ADMELOG) corrective regimen sliding scale   SubCutaneous three times a day before meals  insulin lispro (ADMELOG) corrective regimen sliding scale   SubCutaneous at bedtime  insulin lispro Injectable (ADMELOG) 6 Unit(s) SubCutaneous three times a day before meals  polyethylene glycol 3350 17 Gram(s) Oral daily  sodium chloride 0.9%. 1000 milliLiter(s) (60 mL/Hr) IV Continuous <Continuous>    MEDICATIONS  (PRN):  acetaminophen     Tablet .. 650 milliGRAM(s) Oral every 6 hours PRN Temp greater or equal to 38C (100.4F), Moderate Pain (4 - 6), Severe Pain (7 - 10)  dextrose Oral Gel 15 Gram(s) Oral once PRN Blood Glucose LESS THAN 70 milliGRAM(s)/deciliter  guaiFENesin Oral Liquid (Sugar-Free) 200 milliGRAM(s) Oral every 6 hours PRN Cough  senna 2 Tablet(s) Oral at bedtime PRN Constipation

## 2022-09-09 NOTE — DIETITIAN INITIAL EVALUATION ADULT - NS FNS DIET ORDER
Diet, Consistent Carbohydrate/No Snacks:   Low Sodium  [place] Vegetarian (Accepts Fish) (09-06-22 @ 14:06)

## 2022-09-09 NOTE — PROGRESS NOTE ADULT - PROBLEM SELECTOR PLAN 9
- DVT ppx with lovenox 40mg daily
- DVT ppx with lovenox 40mg daily
- Called patients pharmacy to confirm meds - see med rec. Plavix 75mg filled 6/13/22 for 90 day supply. Patient denies hx of MI or CVA - unclear indication - will continue for now given no bleed on CT. Will also email med rec pharmacist for confirmation of med rec as well as son was not sure of meds either. Patient unsure if she still takes metformin in addition to Januvia.   - DVT ppx with lovenox 40mg daily

## 2022-09-09 NOTE — PROGRESS NOTE ADULT - PROBLEM SELECTOR PLAN 6
- patient reports recurrence of presumed L cataract despite having surgery previously   - will need close outpatient follow up with ophtho

## 2022-09-09 NOTE — PROGRESS NOTE ADULT - SUBJECTIVE AND OBJECTIVE BOX
Chief Complaint: DM 2    History: Patient seen at bedside. Reports she is eating meals, denies n/v, denies s/s of hypoglycemia  Discharge planning for today. Had issue yesterday with insulin/supplies coverage which is now resolved  Hyperglycemia noted, insulin dosing increased     MEDICATIONS  (STANDING):  atorvastatin 40 milliGRAM(s) Oral at bedtime  cefTRIAXone   IVPB 1000 milliGRAM(s) IV Intermittent every 24 hours  chlorhexidine 2% Cloths 1 Application(s) Topical daily  clopidogrel Tablet 75 milliGRAM(s) Oral daily  dextrose 5%. 1000 milliLiter(s) (100 mL/Hr) IV Continuous <Continuous>  dextrose 5%. 1000 milliLiter(s) (50 mL/Hr) IV Continuous <Continuous>  dextrose 50% Injectable 25 Gram(s) IV Push once  dextrose 50% Injectable 12.5 Gram(s) IV Push once  dextrose 50% Injectable 25 Gram(s) IV Push once  enalapril 10 milliGRAM(s) Oral daily  enoxaparin Injectable 40 milliGRAM(s) SubCutaneous every 24 hours  influenza  Vaccine (HIGH DOSE) 0.7 milliLiter(s) IntraMuscular once  insulin glargine Injectable (LANTUS) 18 Unit(s) SubCutaneous at bedtime  insulin lispro (ADMELOG) corrective regimen sliding scale   SubCutaneous three times a day before meals  insulin lispro (ADMELOG) corrective regimen sliding scale   SubCutaneous at bedtime  insulin lispro Injectable (ADMELOG) 6 Unit(s) SubCutaneous three times a day before meals  polyethylene glycol 3350 17 Gram(s) Oral daily  sodium chloride 0.9%. 1000 milliLiter(s) (60 mL/Hr) IV Continuous <Continuous>    MEDICATIONS  (PRN):  acetaminophen     Tablet .. 650 milliGRAM(s) Oral every 6 hours PRN Temp greater or equal to 38C (100.4F), Moderate Pain (4 - 6), Severe Pain (7 - 10)  dextrose Oral Gel 15 Gram(s) Oral once PRN Blood Glucose LESS THAN 70 milliGRAM(s)/deciliter  guaiFENesin Oral Liquid (Sugar-Free) 200 milliGRAM(s) Oral every 6 hours PRN Cough  senna 2 Tablet(s) Oral at bedtime PRN Constipation    No Known Allergies    Review of Systems:  Cardiovascular: No chest pain  Respiratory: No SOB  GI: No nausea, vomiting  Endocrine: no hypoglycemia     PHYSICAL EXAM:  VITALS: T(C): 36.9 (09-09-22 @ 05:40)  T(F): 98.5 (09-09-22 @ 05:40), Max: 99.3 (09-08-22 @ 21:44)  HR: 88 (09-09-22 @ 05:40) (88 - 100)  BP: 152/77 (09-09-22 @ 05:40) (138/77 - 152/77)  RR:  (17 - 18)  SpO2:  (97% - 100%)  Wt(kg): --  GENERAL: NAD  EYES: No proptosis, no lid lag, anicteric  HEENT:  Atraumatic, Normocephalic, moist mucous membranes  RESPIRATORY: unlabored respirations     CAPILLARY BLOOD GLUCOSE    POCT Blood Glucose.: 319 mg/dL (09 Sep 2022 12:20)  POCT Blood Glucose.: 215 mg/dL (09 Sep 2022 08:34)  POCT Blood Glucose.: 218 mg/dL (08 Sep 2022 21:23)  POCT Blood Glucose.: 248 mg/dL (08 Sep 2022 17:29)      09-09    138  |  99  |  3<L>  ----------------------------<  228<H>  5.2   |  31  |  0.36<L>    eGFR: 107    Ca    9.7      09-09  Mg     1.70     09-09  Phos  4.0     09-09    TPro  6.6  /  Alb  2.8<L>  /  TBili  0.5  /  DBili  x   /  AST  22  /  ALT  14  /  AlkPhos  64  09-07      Thyroid Function Tests:  09-05 @ 21:45 TSH 0.98 FreeT4 -- T3 -- Anti TPO -- Anti Thyroglobulin Ab -- TSI --      A1C with Estimated Average Glucose Result: 11.9 % (09-06-22 @ 10:10)    Diet, Consistent Carbohydrate/No Snacks:   Low Sodium  [employer] Vegetarian (Accepts Fish) (09-06-22 @ 14:06)

## 2022-09-09 NOTE — PROGRESS NOTE ADULT - ASSESSMENT
73 yo F with PMH of HTN, HLD, uncontrolled DM2 presenting with cough, found to have pneumonia. Endocrine consulted for DM2 management.    1. Type 2 Diabetes Mellitus, uncontrolled  HbA1c 11.9%  Home Regimen: Metformin 1000 mg PO BID, Januvia 100 mg PO daily  cpeptide checked, resulted LOW - risk for DKA without basal insulin     While inpatient:  BG target 100-180 mg/dl  Increase Lantus to 18 units SQ qHS  Continue Admelog 6 units SQ TID before meals (Hold if NPO/not eating meal)   Continue Admelog LOW dose correctional scale before meals, low dose at bedtime   Check BG before meals and bedtime  Consistent carbohydrate diet, RD consult  DM Education - Daughter in law will administer insulin and check FS. Is RN, knows how to use insulin PEN and glucometer.    Discharge Plan:  Lantus Solostar PEN 18 units SQ qHS + Metformin 1000 mg PO BID + Januvia 100 mg PO daily   Please prescribe new glucometer, glucose test strips, lancets, alcohol swabs and insulin PEN needles  [employer] [place] checked for coverage - denied by Medicaid as patient is not on 3 insulin injections/day or greater. Patient's family is ok with fingersticks for now.  Patient should make diet and lifestyle changes (moderate carbs, exercise) and followup as outpatient for further titration and monitoring  Followup with PCP, endocrinology, opthalmology and podiatry as outpatient  For endocrine followup - [facility] at [address] [address], [phone]  *Emailed office to assist with scheduling. They will contact family directly.     2. Hypertension  BP goal <130/80  Above goal on Enalapril  Titration as per primary team    3. Hyperlipidemia  LDL 36, within goal of less than 70  Continue Atorvastatin 40 mg daily if no contraindications     Rita Singleton  Nurse Practitioner  Division of Endocrinology & Diabetes  In house pager #[phone]/long range pager #[phone]    If before 9AM or after 6PM, or on weekends/holidays, please call endocrine answering service for assistance ([phone]).  For nonurgent matters email [email or web address] for assistance.

## 2022-09-09 NOTE — PROGRESS NOTE ADULT - REASON FOR ADMISSION
Cough and Shortness of breath

## 2022-09-09 NOTE — PROGRESS NOTE ADULT - PROBLEM SELECTOR PLAN 7
- lipid profile reviewed   - continue home lipitor 40

## 2022-09-09 NOTE — DIETITIAN INITIAL EVALUATION ADULT - PERTINENT LABORATORY DATA
09-09    138  |  99  |  3<L>  ----------------------------<  228<H>  5.2   |  31  |  0.36<L>    Ca    9.7      09 Sep 2022 06:40  Phos  4.0     09-09  Mg     1.70     09-09    POCT Blood Glucose.: 215 mg/dL (09-09-22 @ 08:34)  A1C with Estimated Average Glucose Result: 11.9 % (09-06-22 @ 10:10)

## 2022-09-09 NOTE — PROGRESS NOTE ADULT - PROBLEM SELECTOR PLAN 2
- HgA1c 11.6  - appreciate endo recs  - discussed plan of care with son via phone - family amenable to home insulin - daughter in law is nurse and can administer
- HgA1c 11.6  - appreciate endo recs  - discussed plan of care with son via phone - family amenable to home insulin - daughter in law is nurse and can administer
- HgA1c 11.6  - appreciate endo recs; uptitrate basal bolus regimen as per endo      - pt refusing to use insulin at home - states she has been told by her PMD that insulin is 'too strong' for her  - monitor FS - encourage pt to check FS at home as well

## 2022-09-09 NOTE — PROGRESS NOTE ADULT - PROBLEM SELECTOR PLAN 1
- Patient reported hypoxia to 85% at home, now with good room air saturation here in high 90s  - Elevated D-dimer however CTA negative for D-dimer - does show multifocal PNA though   - continue with ceftriaxone (9/6-)- can d/c azithromycin for now given neg legionella   - follow up cultures (blood/urine)  - Robitussin ordered for help with expectoration   - CTA shows findings that could be 2/2 aspiration - patient denies any trouble swallowing or choking, eats regular consistency diet at home; she passed bedside dysphagia screen here  - will start on diet, but will check S+S for full evaluation to be safe
- Patient reported hypoxia to 85% at home, now with good room air saturation here in high 90s  - Elevated D-dimer however CTA negative for D-dimer - does show multifocal PNA though   - continue with ceftriaxone (9/6-)- can d/c azithromycin for now given neg legionella   - cultures neg to date   - Robitussin ordered for help with expectoration   - swallow eval recommends regular consistency with thin liquids
- Patient reported hypoxia to 85% at home, now with good room air saturation here in high 90s  - Elevated D-dimer however CTA negative for D-dimer - does show multifocal PNA though   - continue with ceftriaxone (9/6-)- can d/c azithromycin for now given neg legionella   - cultures neg to date   - Robitussin ordered for help with expectoration   - swallow eval recommends regular consistency with thin liquids

## 2022-09-09 NOTE — DIETITIAN INITIAL EVALUATION ADULT - OTHER INFO
Patient states appetite and PO intake improving since yesterday. Had most of the meals yesterday. Per nursing flowsheet, % on 9/8 recorded compared to 26-50% (9/7) and 51-75% on 9/6. Patient was having breakfast at time of visit. Enjoying her meal and states she will complete all today. Patient denies any nausea/vomiting/diarrhea/constipation or difficulty chewing and swallowing. NKFA. RD provided the patient with extensive verbal and written DM diet education; including, carbohydrate sources of foods, carb counting, portion control, food groups and pre-prandial and post-prandial finger stick goals, and HbA1c goal. Patient was receptive to information provided and verbalized good understanding. RD remains available, patient made aware.

## 2022-09-09 NOTE — DIETITIAN INITIAL EVALUATION ADULT - REASON FOR ADMISSION
Pneumonia due to infectious organism  73 y/o Female with medical history inclusive of HTN, HLD, uncontrolled DM2 (A1C of 11.9% on 09.06.22) presenting with cough, found to have pneumonia.

## 2022-09-09 NOTE — DIETITIAN INITIAL EVALUATION ADULT - ORAL INTAKE PTA/DIET HISTORY
Patient reports usually with good appetite and PO intake but somewhat declined associates with illness. Otherwise, denies any other issues. Patient is on metformin 1000 mg bid, januvia 100 mg daily at home. Patient is ordered for Vegetarian [employer] diet but denies same diet. Reports she does accept meat but does not eat often. Also accepting of dairy products and eggs. Avoids only beef.

## 2022-09-09 NOTE — PROGRESS NOTE ADULT - NSPROGADDITIONALINFOA_GEN_ALL_CORE
discharge planning and coordination ~ 45mins.
Greater than 35 minutes spent on total encounter; the necessity of the time spent during the encounter on this date of service was due to development of plan of care/coordination of care/glycemic control through review of labs, blood glucose values and vital signs. Long discussion with patient and family member to review plan of care and educate, also coordinated with outpatient office for scheduling of endocrinology appointments
son updated via phone on 9/8.

## 2022-09-09 NOTE — CHART NOTE - NSCHARTNOTEFT_GEN_A_CORE
Notified by RN that patient's family called the hospital stating that they thought they had more Plavix at home but they did not. Plavix 75 mg daily x14d sent to preferred pharmacy ([facility]). Family will  medication in AM.

## 2022-09-09 NOTE — PROGRESS NOTE ADULT - PROBLEM SELECTOR PLAN 3
- 3 reported falls recently - no reported injuries   - patient initially reported head trauma to ED and then denied - currently denies injuries   - CT head negative for acute findings  - PT eval - no needs   - fall prevention precautions ordered
- 3 reported falls recently - no reported injuries   - patient initially reported head trauma to ED and then denied - currently denies injuries   - CT head negative for acute findings  - PT eval - no needs   - fall prevention precautions ordered
- 3 reported falls recently - no reported injuries   - patient initially reported head trauma to ED and then denied - currently denies injuries   - CT head negative for acute findings  - PT eval ordered - patient may benefit from having cane or walker for safety   - fall prevention precautions ordered

## 2022-09-11 LAB
CULTURE RESULTS: SIGNIFICANT CHANGE UP
CULTURE RESULTS: SIGNIFICANT CHANGE UP
SPECIMEN SOURCE: SIGNIFICANT CHANGE UP
SPECIMEN SOURCE: SIGNIFICANT CHANGE UP

## 2022-09-14 NOTE — CHART NOTE - NSCHARTNOTEFT_GEN_A_CORE
Pt exposed to COVID 9/9. Unable to reach pt directly - spoke with daughter-in-law at [phone], informed of exposure.     ACP  [id number]

## 2023-03-06 NOTE — PATIENT PROFILE ADULT - NSTRANSFEREYEGLASSESPAIRS_GEN_A_NUR
Detail Level: Detailed Depth Of Biopsy: dermis Was A Bandage Applied: Yes Size Of Lesion In Cm: 0 Biopsy Type: H and E Biopsy Method: Dermablade Anesthesia Type: 1% lidocaine with epinephrine Anesthesia Volume In Cc: 0.5 Hemostasis: Drysol Wound Care: Petrolatum Dressing: bandage Destruction After The Procedure: No Type Of Destruction Used: Curettage Curettage Text: The wound bed was treated with curettage after the biopsy was performed. Cryotherapy Text: The wound bed was treated with cryotherapy after the biopsy was performed. Electrodesiccation Text: The wound bed was treated with electrodesiccation after the biopsy was performed. Electrodesiccation And Curettage Text: The wound bed was treated with electrodesiccation and curettage after the biopsy was performed. Silver Nitrate Text: The wound bed was treated with silver nitrate after the biopsy was performed. Lab: [id number] Lab Facility: [id number] Consent: Written consent was obtained and risks were reviewed including but not limited to scarring, infection, bleeding, scabbing, incomplete removal, nerve damage and allergy to anesthesia. Post-Care Instructions: I reviewed with the patient in detail post-care instructions. Patient is to keep the biopsy site dry overnight, and then apply bacitracin twice daily until healed. Patient may apply hydrogen peroxide soaks to remove any crusting. Notification Instructions: Patient will be notified of biopsy results. However, patient instructed to call the office if not contacted within 2 weeks. Billing Type: Third-Party Bill Information: Selecting Yes will display possible errors in your note based on the variables you have selected. This validation is only offered as a suggestion for you. PLEASE NOTE THAT THE VALIDATION TEXT WILL BE REMOVED WHEN YOU FINALIZE YOUR NOTE. IF YOU WANT TO FAX A PRELIMINARY NOTE YOU WILL NEED TO TOGGLE THIS TO 'NO' IF YOU DO NOT WANT IT IN YOUR FAXED NOTE. 1 pair

## 2023-07-03 NOTE — PROGRESS NOTE ADULT - PROBLEM SELECTOR PROBLEM 1
Type 2 diabetes mellitus with hyperglycemia
Multifocal pneumonia
n/a

## 2023-07-28 NOTE — PROGRESS NOTE ADULT - ASSESSMENT
Size Of Lesion In Cm: 0 72F PMHx HTN, HLD, DM (on oral meds) who presents c/o productive cough, found to have multifocal PNA and hyperglycemia.
